# Patient Record
Sex: MALE | Race: WHITE | NOT HISPANIC OR LATINO | Employment: UNEMPLOYED | URBAN - METROPOLITAN AREA
[De-identification: names, ages, dates, MRNs, and addresses within clinical notes are randomized per-mention and may not be internally consistent; named-entity substitution may affect disease eponyms.]

---

## 2019-09-09 ENCOUNTER — HISTORICAL (OUTPATIENT)
Dept: RADIOLOGY | Facility: HOSPITAL | Age: 39
End: 2019-09-09

## 2022-08-10 ENCOUNTER — APPOINTMENT (OUTPATIENT)
Dept: RADIOLOGY | Facility: HOSPITAL | Age: 42
End: 2022-08-10
Payer: MEDICAID

## 2022-08-10 ENCOUNTER — HOSPITAL ENCOUNTER (OUTPATIENT)
Facility: HOSPITAL | Age: 42
Discharge: HOME | End: 2022-08-11
Attending: EMERGENCY MEDICINE | Admitting: INTERNAL MEDICINE
Payer: MEDICAID

## 2022-08-10 DIAGNOSIS — K56.609 SBO (SMALL BOWEL OBSTRUCTION): Primary | ICD-10-CM

## 2022-08-10 PROBLEM — E03.9 HYPOTHYROIDISM: Chronic | Status: ACTIVE | Noted: 2022-08-10

## 2022-08-10 PROBLEM — G47.33 OBSTRUCTIVE SLEEP APNEA SYNDROME: Status: ACTIVE | Noted: 2022-08-10

## 2022-08-10 PROBLEM — G47.33 OBSTRUCTIVE SLEEP APNEA SYNDROME: Chronic | Status: ACTIVE | Noted: 2022-08-10

## 2022-08-10 PROBLEM — E03.9 HYPOTHYROIDISM: Status: ACTIVE | Noted: 2022-08-10

## 2022-08-10 LAB
ALBUMIN SERPL-MCNC: 4.2 GM/DL (ref 3.5–5)
ALBUMIN/GLOB SERPL: 1.2 RATIO (ref 1.1–2)
ALP SERPL-CCNC: 66 UNIT/L (ref 40–150)
ALT SERPL-CCNC: 22 UNIT/L (ref 0–55)
AST SERPL-CCNC: 19 UNIT/L (ref 5–34)
BASOPHILS # BLD AUTO: 0.08 X10(3)/MCL (ref 0–0.2)
BASOPHILS NFR BLD AUTO: 0.7 %
BILIRUBIN DIRECT+TOT PNL SERPL-MCNC: 0.8 MG/DL
BUN SERPL-MCNC: 16 MG/DL (ref 8.9–20.6)
CALCIUM SERPL-MCNC: 9.8 MG/DL (ref 8.4–10.2)
CHLORIDE SERPL-SCNC: 105 MMOL/L (ref 98–107)
CO2 SERPL-SCNC: 16 MMOL/L (ref 22–29)
CREAT SERPL-MCNC: 0.95 MG/DL (ref 0.73–1.18)
EOSINOPHIL # BLD AUTO: 0.14 X10(3)/MCL (ref 0–0.9)
EOSINOPHIL NFR BLD AUTO: 1.2 %
ERYTHROCYTE [DISTWIDTH] IN BLOOD BY AUTOMATED COUNT: 13.5 % (ref 11.5–17)
EST. AVERAGE GLUCOSE BLD GHB EST-MCNC: 162.8 MG/DL
GFR SERPLBLD CREATININE-BSD FMLA CKD-EPI: >60 MLS/MIN/1.73/M2
GLOBULIN SER-MCNC: 3.6 GM/DL (ref 2.4–3.5)
GLUCOSE SERPL-MCNC: 208 MG/DL (ref 74–100)
HBA1C MFR BLD: 7.3 %
HCT VFR BLD AUTO: 49 % (ref 42–52)
HGB BLD-MCNC: 16.5 GM/DL (ref 14–18)
IMM GRANULOCYTES # BLD AUTO: 0.06 X10(3)/MCL (ref 0–0.04)
IMM GRANULOCYTES NFR BLD AUTO: 0.5 %
LACTATE SERPL-SCNC: 1.3 MMOL/L (ref 0.5–2.2)
LIPASE SERPL-CCNC: 36 U/L
LYMPHOCYTES # BLD AUTO: 1.93 X10(3)/MCL (ref 0.6–4.6)
LYMPHOCYTES NFR BLD AUTO: 16.6 %
MAGNESIUM SERPL-MCNC: 1.9 MG/DL (ref 1.6–2.6)
MCH RBC QN AUTO: 27.9 PG (ref 27–31)
MCHC RBC AUTO-ENTMCNC: 33.7 MG/DL (ref 33–36)
MCV RBC AUTO: 82.9 FL (ref 80–94)
MONOCYTES # BLD AUTO: 1.4 X10(3)/MCL (ref 0.1–1.3)
MONOCYTES NFR BLD AUTO: 12 %
NEUTROPHILS # BLD AUTO: 8 X10(3)/MCL (ref 2.1–9.2)
NEUTROPHILS NFR BLD AUTO: 69 %
PLATELET # BLD AUTO: 325 X10(3)/MCL (ref 130–400)
PMV BLD AUTO: 9 FL (ref 7.4–10.4)
POCT GLUCOSE: 166 MG/DL (ref 70–110)
POCT GLUCOSE: 173 MG/DL (ref 70–110)
POTASSIUM SERPL-SCNC: 3.9 MMOL/L (ref 3.5–5.1)
PROT SERPL-MCNC: 7.8 GM/DL (ref 6.4–8.3)
RBC # BLD AUTO: 5.91 X10(6)/MCL (ref 4.7–6.1)
SARS-COV-2 RDRP RESP QL NAA+PROBE: NEGATIVE
SODIUM SERPL-SCNC: 135 MMOL/L (ref 136–145)
WBC # SPEC AUTO: 11.6 X10(3)/MCL (ref 4.5–11.5)

## 2022-08-10 PROCEDURE — 96374 THER/PROPH/DIAG INJ IV PUSH: CPT

## 2022-08-10 PROCEDURE — 99285 EMERGENCY DEPT VISIT HI MDM: CPT | Mod: 25

## 2022-08-10 PROCEDURE — 83690 ASSAY OF LIPASE: CPT | Performed by: EMERGENCY MEDICINE

## 2022-08-10 PROCEDURE — 82962 GLUCOSE BLOOD TEST: CPT

## 2022-08-10 PROCEDURE — 25500020 PHARM REV CODE 255: Performed by: EMERGENCY MEDICINE

## 2022-08-10 PROCEDURE — 36415 COLL VENOUS BLD VENIPUNCTURE: CPT | Performed by: EMERGENCY MEDICINE

## 2022-08-10 PROCEDURE — 80053 COMPREHEN METABOLIC PANEL: CPT | Performed by: EMERGENCY MEDICINE

## 2022-08-10 PROCEDURE — 63600175 PHARM REV CODE 636 W HCPCS: Performed by: EMERGENCY MEDICINE

## 2022-08-10 PROCEDURE — 83735 ASSAY OF MAGNESIUM: CPT | Performed by: EMERGENCY MEDICINE

## 2022-08-10 PROCEDURE — 83605 ASSAY OF LACTIC ACID: CPT | Performed by: EMERGENCY MEDICINE

## 2022-08-10 PROCEDURE — 11000001 HC ACUTE MED/SURG PRIVATE ROOM

## 2022-08-10 PROCEDURE — G0378 HOSPITAL OBSERVATION PER HR: HCPCS

## 2022-08-10 PROCEDURE — 83036 HEMOGLOBIN GLYCOSYLATED A1C: CPT | Performed by: INTERNAL MEDICINE

## 2022-08-10 PROCEDURE — 87635 SARS-COV-2 COVID-19 AMP PRB: CPT | Performed by: EMERGENCY MEDICINE

## 2022-08-10 PROCEDURE — 85025 COMPLETE CBC W/AUTO DIFF WBC: CPT | Performed by: EMERGENCY MEDICINE

## 2022-08-10 PROCEDURE — 25000003 PHARM REV CODE 250: Performed by: INTERNAL MEDICINE

## 2022-08-10 PROCEDURE — 74177 CT ABD & PELVIS W/CONTRAST: CPT | Mod: TC

## 2022-08-10 RX ORDER — ONDANSETRON 2 MG/ML
8 INJECTION INTRAMUSCULAR; INTRAVENOUS EVERY 6 HOURS PRN
Status: DISCONTINUED | OUTPATIENT
Start: 2022-08-10 | End: 2022-08-11 | Stop reason: HOSPADM

## 2022-08-10 RX ORDER — ACETAMINOPHEN 325 MG/1
650 TABLET ORAL EVERY 6 HOURS PRN
Status: DISCONTINUED | OUTPATIENT
Start: 2022-08-10 | End: 2022-08-11 | Stop reason: HOSPADM

## 2022-08-10 RX ORDER — INSULIN ASPART 100 [IU]/ML
0-5 INJECTION, SOLUTION INTRAVENOUS; SUBCUTANEOUS EVERY 6 HOURS PRN
Status: DISCONTINUED | OUTPATIENT
Start: 2022-08-10 | End: 2022-08-11 | Stop reason: HOSPADM

## 2022-08-10 RX ORDER — MORPHINE SULFATE 4 MG/ML
2 INJECTION, SOLUTION INTRAMUSCULAR; INTRAVENOUS EVERY 4 HOURS PRN
Status: DISCONTINUED | OUTPATIENT
Start: 2022-08-10 | End: 2022-08-11 | Stop reason: HOSPADM

## 2022-08-10 RX ORDER — TALC
6 POWDER (GRAM) TOPICAL NIGHTLY PRN
Status: DISCONTINUED | OUTPATIENT
Start: 2022-08-10 | End: 2022-08-10

## 2022-08-10 RX ORDER — SODIUM CHLORIDE 0.9 % (FLUSH) 0.9 %
10 SYRINGE (ML) INJECTION
Status: DISCONTINUED | OUTPATIENT
Start: 2022-08-10 | End: 2022-08-11 | Stop reason: HOSPADM

## 2022-08-10 RX ORDER — SODIUM CHLORIDE 9 MG/ML
INJECTION, SOLUTION INTRAVENOUS CONTINUOUS
Status: DISCONTINUED | OUTPATIENT
Start: 2022-08-10 | End: 2022-08-11 | Stop reason: HOSPADM

## 2022-08-10 RX ORDER — GLUCAGON 1 MG
1 KIT INJECTION
Status: DISCONTINUED | OUTPATIENT
Start: 2022-08-10 | End: 2022-08-11 | Stop reason: HOSPADM

## 2022-08-10 RX ORDER — SODIUM CHLORIDE, SODIUM LACTATE, POTASSIUM CHLORIDE, CALCIUM CHLORIDE 600; 310; 30; 20 MG/100ML; MG/100ML; MG/100ML; MG/100ML
1000 INJECTION, SOLUTION INTRAVENOUS
Status: COMPLETED | OUTPATIENT
Start: 2022-08-10 | End: 2022-08-10

## 2022-08-10 RX ORDER — ONDANSETRON 2 MG/ML
4 INJECTION INTRAMUSCULAR; INTRAVENOUS
Status: COMPLETED | OUTPATIENT
Start: 2022-08-10 | End: 2022-08-10

## 2022-08-10 RX ORDER — DIPHENHYDRAMINE HYDROCHLORIDE 50 MG/ML
12.5 INJECTION INTRAMUSCULAR; INTRAVENOUS EVERY 6 HOURS PRN
Status: DISCONTINUED | OUTPATIENT
Start: 2022-08-10 | End: 2022-08-11 | Stop reason: HOSPADM

## 2022-08-10 RX ADMIN — SODIUM CHLORIDE, POTASSIUM CHLORIDE, SODIUM LACTATE AND CALCIUM CHLORIDE 1000 ML: 600; 310; 30; 20 INJECTION, SOLUTION INTRAVENOUS at 02:08

## 2022-08-10 RX ADMIN — IOPAMIDOL 100 ML: 755 INJECTION, SOLUTION INTRAVENOUS at 12:08

## 2022-08-10 RX ADMIN — ONDANSETRON 4 MG: 2 INJECTION INTRAMUSCULAR; INTRAVENOUS at 10:08

## 2022-08-10 RX ADMIN — SODIUM CHLORIDE: 9 INJECTION, SOLUTION INTRAVENOUS at 05:08

## 2022-08-10 NOTE — HPI
41yo WM with h/o DM2, HTN, RAMY, Morbid Obesity, Hypothyroid presented to ED c/o abd pain, N/V that began yest and persisted today. Also had abd swelling that has resolved since admit. Denies any fever/chills/hematemesis. Did have BM earlier this morning. No recent flatus. He is belching occasionally. Denies any current nausea. Abd pain much improved. Found to have SBO on CT. Admitted for tx. No NGT at this time.

## 2022-08-10 NOTE — H&P
[facility] Medicine  History & Physical    Patient Name: Suhas Clark  MRN: [id number]  Patient Class: IP- Inpatient  Admission Date: 8/10/2022  Attending Physician: Kit Garcia MD  Primary Care Provider: SHINE Vaughn         Patient information was obtained from patient, past medical records and ER records.     Subjective:     Principal Problem:<principal problem not specified>    Chief Complaint:   Chief Complaint   Patient presents with    Abdominal Pain     Pt c/o abd pain vomiting since yesterday states his intestines feel swollen. Lat BM yesterday. Dx with covid 2 days ago.        HPI: 43yo WM with h/o DM2, HTN, RAMY, Morbid Obesity, Hypothyroid presented to ED c/o abd pain, N/V that began yest and persisted today. Also had abd swelling that has resolved since admit. Denies any fever/chills/hematemesis. Did have BM earlier this morning. No recent flatus. He is belching occasionally. Denies any current nausea. Abd pain much improved. Found to have SBO on CT. Admitted for tx. No NGT at this time.       Past Medical History:   Diagnosis Date    Diabetes mellitus     Hypertension     Thyroid disease        No past surgical history on file.    Review of patient's allergies indicates:   Allergen Reactions    Lisinopril Anaphylaxis       No current facility-administered medications on file prior to encounter.     No current outpatient medications on file prior to encounter.     Family History    None       Tobacco Use    Smoking status: Never Smoker    Smokeless tobacco: Never Used   Substance and Sexual Activity    Alcohol use: Not Currently    Drug use: Not on file    Sexual activity: Not on file     Review of Systems: 10 systems reviewed all pertinent positives and negatives stated in HPI, otherwise negative.      Objective:     Vital Signs (Most Recent):  Temp: 97.7 °F (36.5 °C) (08/10/22 1608)  Pulse: 86 (08/10/22 1608)  Resp: 18 (08/10/22  1549)  BP: 122/85 (08/10/22 1608)  SpO2: (!) 92 % (08/10/22 1608)   Vital Signs (24h Range):  Temp:  [97.5 °F (36.4 °C)-97.7 °F (36.5 °C)] 97.7 °F (36.5 °C)  Pulse:  [86-98] 86  Resp:  [18-20] 18  SpO2:  [92 %-96 %] 92 %  BP: (122-168)/() 122/85     Weight: (!) 188.7 kg (416 lb)  Body mass index is 61.43 kg/m².    Physical Exam  Constitutional:       General: He is awake.      Appearance: Normal appearance. He is well-developed. He is morbidly obese. He is not ill-appearing.   HENT:      Head: Normocephalic.      Nose: Nose normal.      Mouth/Throat:      Mouth: Mucous membranes are moist.      Pharynx: Oropharynx is clear.   Eyes:      Conjunctiva/sclera: Conjunctivae normal.      Pupils: Pupils are equal, round, and reactive to light.   Cardiovascular:      Rate and Rhythm: Normal rate and regular rhythm.      Heart sounds: Normal heart sounds. No murmur heard.  Pulmonary:      Effort: Pulmonary effort is normal.      Breath sounds: Normal breath sounds. No wheezing.   Abdominal:      General: Abdomen is protuberant. Bowel sounds are decreased.      Palpations: Abdomen is soft.      Tenderness: There is abdominal tenderness (mild) in the periumbilical area. There is no guarding.   Musculoskeletal:         General: Normal range of motion.      Cervical back: Normal range of motion.      Right lower leg: No edema.      Left lower leg: No edema.   Skin:     General: Skin is warm and dry.      Capillary Refill: Capillary refill takes less than 2 seconds.   Neurological:      General: No focal deficit present.      Mental Status: He is alert and oriented to person, place, and time. Mental status is at baseline.   Psychiatric:         Mood and Affect: Mood normal.         Behavior: Behavior normal. Behavior is cooperative.         Thought Content: Thought content normal.         Judgment: Judgment normal.          Significant Labs: All pertinent labs within the past 24 hours have been reviewed.  CBC:   Recent Labs    Lab 08/10/22  1009   WBC 11.6*   HGB 16.5   HCT 49.0        CMP:   Recent Labs   Lab 08/10/22  1111   *   K 3.9   CO2 16*   BUN 16.0   CREATININE 0.95   CALCIUM 9.8   ALBUMIN 4.2   BILITOT 0.8   ALKPHOS 66   AST 19   ALT 22       Significant Imaging: I have reviewed all pertinent imaging results/findings within the past 24 hours.    CT Abdomen Pelvis With Contrast  Narrative: EXAMINATION:  CT ABDOMEN PELVIS WITH CONTRAST    CLINICAL HISTORY:  Abdominal pain, acute, nonlocalized;    TECHNIQUE:  Low dose axial images, sagittal and coronal reformations were obtained from the lung bases to the pubic symphysis following the IV administration of 100 mL of Omnipaque 350 .  Oral contrast not given.  DLP 1672.  Automated exposure control used.    COMPARISON:  08/28/2019    FINDINGS:  Liver demonstrates normal enhancement with no focal lesion.  Gallbladder removed.  Pancreas, stomach, spleen, adrenal glands normal.  Kidneys normal enhancement bilaterally.  A few scattered cysts on the right.  Suspected nonobstructing calcification lower pole left kidney measuring 2 mm.  No hydronephrosis.  Aorta tapers normally.  There are dilated fluid filled small bowel loops in the proximal ileum region in the mid abdomen with a suspected transition zone in the periumbilical region image 57 series 3.  Distal small bowel loops are decompressed.  Colon decompressed.  No obstructive mass evident.  Appendix normal.  No inflammatory change or lymphadenopathy.  Bladder and rectum normal.  Bones intact.  Lung bases clear.  Impression: Suspected developing bowel obstruction with proximal ileus moderately dilated and fluid-filled and with the somewhat localized transition zone in the periumbilical region.    Electronically signed by: Florinda Isaac MD  Date:    08/10/2022  Time:    13:06      Assessment/Plan:     Problem Noted   Sbo (Small Bowel Obstruction) 8/10/2022   Hypothyroidism 8/10/2022   Obstructive Sleep Apnea Syndrome  8/10/2022   Diabetes Mellitus    Hypertension      - NPO  - IVF  - analgesia/antiemetics prn  - SSI  - am labs  - am SBFT  - home meds when eduin PO        VTE Risk Mitigation (From admission, onward)         Ordered     IP VTE HIGH RISK PATIENT  Once         08/10/22 1356     Place sequential compression device  Until discontinued         08/10/22 1356                   Kit Garcia MD  Department of Hospital Medicine   [facility]

## 2022-08-10 NOTE — SUBJECTIVE & OBJECTIVE
Past Medical History:   Diagnosis Date    Diabetes mellitus     Hypertension     Thyroid disease        No past surgical history on file.    Review of patient's allergies indicates:   Allergen Reactions    Lisinopril Anaphylaxis       No current facility-administered medications on file prior to encounter.     No current outpatient medications on file prior to encounter.     Family History    None       Tobacco Use    Smoking status: Never Smoker    Smokeless tobacco: Never Used   Substance and Sexual Activity    Alcohol use: Not Currently    Drug use: Not on file    Sexual activity: Not on file     Review of Systems: 10 systems reviewed all pertinent positives and negatives stated in HPI, otherwise negative.      Objective:     Vital Signs (Most Recent):  Temp: 97.7 °F (36.5 °C) (08/10/22 1608)  Pulse: 86 (08/10/22 1608)  Resp: 18 (08/10/22 1549)  BP: 122/85 (08/10/22 1608)  SpO2: (!) 92 % (08/10/22 1608)   Vital Signs (24h Range):  Temp:  [97.5 °F (36.4 °C)-97.7 °F (36.5 °C)] 97.7 °F (36.5 °C)  Pulse:  [86-98] 86  Resp:  [18-20] 18  SpO2:  [92 %-96 %] 92 %  BP: (122-168)/() 122/85     Weight: (!) 188.7 kg (416 lb)  Body mass index is 61.43 kg/m².    Physical Exam  Constitutional:       General: He is awake.      Appearance: Normal appearance. He is well-developed. He is morbidly obese. He is not ill-appearing.   HENT:      Head: Normocephalic.      Nose: Nose normal.      Mouth/Throat:      Mouth: Mucous membranes are moist.      Pharynx: Oropharynx is clear.   Eyes:      Conjunctiva/sclera: Conjunctivae normal.      Pupils: Pupils are equal, round, and reactive to light.   Cardiovascular:      Rate and Rhythm: Normal rate and regular rhythm.      Heart sounds: Normal heart sounds. No murmur heard.  Pulmonary:      Effort: Pulmonary effort is normal.      Breath sounds: Normal breath sounds. No wheezing.   Abdominal:      General: Abdomen is protuberant. Bowel sounds are decreased.      Palpations: Abdomen  is soft.      Tenderness: There is abdominal tenderness (mild) in the periumbilical area. There is no guarding.   Musculoskeletal:         General: Normal range of motion.      Cervical back: Normal range of motion.      Right lower leg: No edema.      Left lower leg: No edema.   Skin:     General: Skin is warm and dry.      Capillary Refill: Capillary refill takes less than 2 seconds.   Neurological:      General: No focal deficit present.      Mental Status: He is alert and oriented to person, place, and time. Mental status is at baseline.   Psychiatric:         Mood and Affect: Mood normal.         Behavior: Behavior normal. Behavior is cooperative.         Thought Content: Thought content normal.         Judgment: Judgment normal.          Significant Labs: All pertinent labs within the past 24 hours have been reviewed.  CBC:   Recent Labs   Lab 08/10/22  1009   WBC 11.6*   HGB 16.5   HCT 49.0        CMP:   Recent Labs   Lab 08/10/22  1111   *   K 3.9   CO2 16*   BUN 16.0   CREATININE 0.95   CALCIUM 9.8   ALBUMIN 4.2   BILITOT 0.8   ALKPHOS 66   AST 19   ALT 22       Significant Imaging: I have reviewed all pertinent imaging results/findings within the past 24 hours.    CT Abdomen Pelvis With Contrast  Narrative: EXAMINATION:  CT ABDOMEN PELVIS WITH CONTRAST    CLINICAL HISTORY:  Abdominal pain, acute, nonlocalized;    TECHNIQUE:  Low dose axial images, sagittal and coronal reformations were obtained from the lung bases to the pubic symphysis following the IV administration of 100 mL of Omnipaque 350 .  Oral contrast not given.  DLP 1672.  Automated exposure control used.    COMPARISON:  08/28/2019    FINDINGS:  Liver demonstrates normal enhancement with no focal lesion.  Gallbladder removed.  Pancreas, stomach, spleen, adrenal glands normal.  Kidneys normal enhancement bilaterally.  A few scattered cysts on the right.  Suspected nonobstructing calcification lower pole left kidney measuring 2 mm.   No hydronephrosis.  Aorta tapers normally.  There are dilated fluid filled small bowel loops in the proximal ileum region in the mid abdomen with a suspected transition zone in the periumbilical region image 57 series 3.  Distal small bowel loops are decompressed.  Colon decompressed.  No obstructive mass evident.  Appendix normal.  No inflammatory change or lymphadenopathy.  Bladder and rectum normal.  Bones intact.  Lung bases clear.  Impression: Suspected developing bowel obstruction with proximal ileus moderately dilated and fluid-filled and with the somewhat localized transition zone in the periumbilical region.    Electronically signed by: Florinda Isaac MD  Date:    08/10/2022  Time:    13:06

## 2022-08-10 NOTE — ED PROVIDER NOTES
Encounter Date: 8/10/2022       History     Chief Complaint   Patient presents with    Abdominal Pain     Pt c/o abd pain vomiting since yesterday states his intestines feel swollen. Lat BM yesterday. Dx with covid 2 days ago.     Patient presents emergency department for chief complaint diffuse abdominal discomfort stating he feels very bloated he has been also having nausea and vomiting.  He was diagnosed with COVID 2 days ago.  He states he has had 1 shot of the J&J vaccine in distant past.  Denies any chest pain or shortness of breath he does have sinus congestion.  His base concerns as nausea and vomiting and abdominal discomfort.  In the past he also had his gallbladder removed.        Review of patient's allergies indicates:   Allergen Reactions    Lisinopril Anaphylaxis     Past Medical History:   Diagnosis Date    Diabetes mellitus     Hypertension     Thyroid disease      No past surgical history on file.  No family history on file.  Social History     Tobacco Use    Smoking status: Never Smoker    Smokeless tobacco: Never Used   Substance Use Topics    Alcohol use: Not Currently     Review of Systems   Constitutional: Negative for fever.   HENT: Positive for congestion. Negative for sore throat.    Respiratory: Negative for shortness of breath.    Cardiovascular: Negative for chest pain.   Gastrointestinal: Positive for abdominal distention, abdominal pain, nausea and vomiting.   Genitourinary: Negative for dysuria.   Musculoskeletal: Negative for back pain.   Skin: Negative for rash.   Neurological: Negative for weakness.   Hematological: Does not bruise/bleed easily.       Physical Exam     Initial Vitals [08/10/22 0927]   BP Pulse Resp Temp SpO2   (!) 168/97 98 20 97.5 °F (36.4 °C) 96 %      MAP       --         Physical Exam    Constitutional: He appears well-developed and well-nourished.   Large body habitus   HENT:   Head: Normocephalic and atraumatic.   Eyes: EOM are normal. Pupils are  equal, round, and reactive to light.   Neck:   Normal range of motion.  Cardiovascular: Normal rate and regular rhythm.   Pulmonary/Chest: Breath sounds normal. No respiratory distress. He has no wheezes. He has no rales.   Abdominal: Abdomen is soft. He exhibits distension. There is abdominal tenderness.   Unable to properly assess bowel sounds due to patient's extremely large body habitus. There is no guarding.   Musculoskeletal:         General: Normal range of motion.      Cervical back: Normal range of motion.     Neurological: He is alert and oriented to person, place, and time.         ED Course   Procedures  Labs Reviewed   COMPREHENSIVE METABOLIC PANEL - Abnormal; Notable for the following components:       Result Value    Sodium Level 135 (*)     Carbon Dioxide 16 (*)     Glucose Level 208 (*)     Globulin 3.6 (*)     All other components within normal limits   CBC WITH DIFFERENTIAL - Abnormal; Notable for the following components:    WBC 11.6 (*)     Mono # 1.40 (*)     IG# 0.06 (*)     All other components within normal limits   POCT GLUCOSE - Abnormal; Notable for the following components:    POCT Glucose 166 (*)     All other components within normal limits   MAGNESIUM - Normal   LIPASE - Normal   SARS-COV-2 RNA AMPLIFICATION, QUAL - Normal   LACTIC ACID, PLASMA - Normal   CBC W/ AUTO DIFFERENTIAL    Narrative:     The following orders were created for panel order CBC auto differential.  Procedure                               Abnormality         Status                     ---------                               -----------         ------                     CBC with Differential[[id number]]        Abnormal            Final result                 Please view results for these tests on the individual orders.          Imaging Results          CT Abdomen Pelvis With Contrast (Final result)  Result time 08/10/22 13:06:51    Final result by Erik Isaac MD (08/10/22 13:06:51)                 Impression:       Suspected developing bowel obstruction with proximal ileus moderately dilated and fluid-filled and with the somewhat localized transition zone in the periumbilical region.      Electronically signed by: Florinda Isaac MD  Date:    08/10/2022  Time:    13:06             Narrative:    EXAMINATION:  CT ABDOMEN PELVIS WITH CONTRAST    CLINICAL HISTORY:  Abdominal pain, acute, nonlocalized;    TECHNIQUE:  Low dose axial images, sagittal and coronal reformations were obtained from the lung bases to the pubic symphysis following the IV administration of 100 mL of Omnipaque 350 .  Oral contrast not given.  DLP 1672.  Automated exposure control used.    COMPARISON:  08/28/2019    FINDINGS:  Liver demonstrates normal enhancement with no focal lesion.  Gallbladder removed.  Pancreas, stomach, spleen, adrenal glands normal.  Kidneys normal enhancement bilaterally.  A few scattered cysts on the right.  Suspected nonobstructing calcification lower pole left kidney measuring 2 mm.  No hydronephrosis.  Aorta tapers normally.  There are dilated fluid filled small bowel loops in the proximal ileum region in the mid abdomen with a suspected transition zone in the periumbilical region image 57 series 3.  Distal small bowel loops are decompressed.  Colon decompressed.  No obstructive mass evident.  Appendix normal.  No inflammatory change or lymphadenopathy.  Bladder and rectum normal.  Bones intact.  Lung bases clear.                                 Medications   sodium chloride 0.9% flush 10 mL (has no administration in time range)   0.9%  NaCl infusion ( Intravenous New Bag 8/10/22 3268)   dextrose 50% injection 12.5 g (has no administration in time range)   glucagon (human recombinant) injection 1 mg (has no administration in time range)   insulin aspart U-100 injection 0-5 Units (has no administration in time range)   morphine injection 2 mg (has no administration in time range)   diphenhydrAMINE injection 12.5 mg (has no  administration in time range)   acetaminophen tablet 650 mg (has no administration in time range)   ondansetron injection 8 mg (8 mg Intravenous Given 8/11/22 0043)   ondansetron injection 4 mg (4 mg Intravenous Given 8/10/22 1011)   iopamidoL (ISOVUE-370) injection 100 mL (100 mLs Intravenous Given 8/10/22 1248)   lactated ringers infusion (1,000 mLs Intravenous New Bag 8/10/22 1439)     Medical Decision Making:   Initial Assessment:   Due to body habitus and complaining of abdominal pain and vomiting with CT abdomen pelvis this time.  Due to patient's weight ascertaining whether patient can fit in scanner allowed to be transferred to outside facility for management.  ED Management:  Time 1:52 p.m.  Patient CT abdomen pelvis shows concern for developing small bowel obstruction with concern for transition point.  Discussed case with Dr. Queen, general surgery.  He reviewed CT scan no surgical intervention at this time.  Will place patient on NPO and fluids.  Patient's nausea and vomiting is controlled with Zofran will defer any NG tube at this time. Dr. Garcia to accept.                      Clinical Impression:   Final diagnoses:  [K56.609] SBO (small bowel obstruction) (Primary)          ED Disposition Condition    Admit               Rony Burch MD  08/11/22 0639

## 2022-08-11 VITALS
DIASTOLIC BLOOD PRESSURE: 78 MMHG | OXYGEN SATURATION: 96 % | HEIGHT: 69 IN | TEMPERATURE: 98 F | RESPIRATION RATE: 18 BRPM | HEART RATE: 88 BPM | WEIGHT: 315 LBS | BODY MASS INDEX: 46.65 KG/M2 | SYSTOLIC BLOOD PRESSURE: 133 MMHG

## 2022-08-11 PROBLEM — K56.609 SBO (SMALL BOWEL OBSTRUCTION): Status: RESOLVED | Noted: 2022-08-10 | Resolved: 2022-08-11

## 2022-08-11 LAB
ALBUMIN SERPL-MCNC: 3.9 GM/DL (ref 3.5–5)
ALBUMIN/GLOB SERPL: 1.4 RATIO (ref 1.1–2)
ALP SERPL-CCNC: 58 UNIT/L (ref 40–150)
ALT SERPL-CCNC: 20 UNIT/L (ref 0–55)
AST SERPL-CCNC: 17 UNIT/L (ref 5–34)
BASOPHILS # BLD AUTO: 0.04 X10(3)/MCL (ref 0–0.2)
BASOPHILS NFR BLD AUTO: 0.6 %
BILIRUBIN DIRECT+TOT PNL SERPL-MCNC: 0.8 MG/DL
BUN SERPL-MCNC: 12 MG/DL (ref 8.9–20.6)
CALCIUM SERPL-MCNC: 9 MG/DL (ref 8.4–10.2)
CHLORIDE SERPL-SCNC: 108 MMOL/L (ref 98–107)
CO2 SERPL-SCNC: 18 MMOL/L (ref 22–29)
CREAT SERPL-MCNC: 0.86 MG/DL (ref 0.73–1.18)
EOSINOPHIL # BLD AUTO: 0.09 X10(3)/MCL (ref 0–0.9)
EOSINOPHIL NFR BLD AUTO: 1.3 %
ERYTHROCYTE [DISTWIDTH] IN BLOOD BY AUTOMATED COUNT: 13.4 % (ref 11.5–17)
GFR SERPLBLD CREATININE-BSD FMLA CKD-EPI: >60 MLS/MIN/1.73/M2
GLOBULIN SER-MCNC: 2.7 GM/DL (ref 2.4–3.5)
GLUCOSE SERPL-MCNC: 124 MG/DL (ref 74–100)
HCT VFR BLD AUTO: 44.2 % (ref 42–52)
HGB BLD-MCNC: 14.6 GM/DL (ref 14–18)
IMM GRANULOCYTES # BLD AUTO: 0.01 X10(3)/MCL (ref 0–0.04)
IMM GRANULOCYTES NFR BLD AUTO: 0.1 %
LYMPHOCYTES # BLD AUTO: 1.2 X10(3)/MCL (ref 0.6–4.6)
LYMPHOCYTES NFR BLD AUTO: 17.5 %
MAGNESIUM SERPL-MCNC: 1.9 MG/DL (ref 1.6–2.6)
MCH RBC QN AUTO: 27.7 PG (ref 27–31)
MCHC RBC AUTO-ENTMCNC: 33 MG/DL (ref 33–36)
MCV RBC AUTO: 83.7 FL (ref 80–94)
MONOCYTES # BLD AUTO: 0.98 X10(3)/MCL (ref 0.1–1.3)
MONOCYTES NFR BLD AUTO: 14.3 %
NEUTROPHILS # BLD AUTO: 4.5 X10(3)/MCL (ref 2.1–9.2)
NEUTROPHILS NFR BLD AUTO: 66.2 %
PHOSPHATE SERPL-MCNC: 3.5 MG/DL (ref 2.3–4.7)
PLATELET # BLD AUTO: 279 X10(3)/MCL (ref 130–400)
PMV BLD AUTO: 9.6 FL (ref 7.4–10.4)
POCT GLUCOSE: 140 MG/DL (ref 70–110)
POCT GLUCOSE: 94 MG/DL (ref 70–110)
POTASSIUM SERPL-SCNC: 4 MMOL/L (ref 3.5–5.1)
PROT SERPL-MCNC: 6.6 GM/DL (ref 6.4–8.3)
RBC # BLD AUTO: 5.28 X10(6)/MCL (ref 4.7–6.1)
SODIUM SERPL-SCNC: 140 MMOL/L (ref 136–145)
WBC # SPEC AUTO: 6.9 X10(3)/MCL (ref 4.5–11.5)

## 2022-08-11 PROCEDURE — 84100 ASSAY OF PHOSPHORUS: CPT | Performed by: INTERNAL MEDICINE

## 2022-08-11 PROCEDURE — 83735 ASSAY OF MAGNESIUM: CPT | Performed by: INTERNAL MEDICINE

## 2022-08-11 PROCEDURE — 80053 COMPREHEN METABOLIC PANEL: CPT | Performed by: INTERNAL MEDICINE

## 2022-08-11 PROCEDURE — 63600175 PHARM REV CODE 636 W HCPCS: Performed by: INTERNAL MEDICINE

## 2022-08-11 PROCEDURE — G0378 HOSPITAL OBSERVATION PER HR: HCPCS

## 2022-08-11 PROCEDURE — 94761 N-INVAS EAR/PLS OXIMETRY MLT: CPT

## 2022-08-11 PROCEDURE — 36415 COLL VENOUS BLD VENIPUNCTURE: CPT | Performed by: INTERNAL MEDICINE

## 2022-08-11 PROCEDURE — 85025 COMPLETE CBC W/AUTO DIFF WBC: CPT | Performed by: INTERNAL MEDICINE

## 2022-08-11 PROCEDURE — 25000003 PHARM REV CODE 250: Performed by: INTERNAL MEDICINE

## 2022-08-11 RX ORDER — ANASTROZOLE 1 MG/1
1 TABLET ORAL
Status: DISCONTINUED | OUTPATIENT
Start: 2022-08-11 | End: 2022-08-11 | Stop reason: HOSPADM

## 2022-08-11 RX ORDER — ATENOLOL 50 MG/1
50 TABLET ORAL DAILY
Status: DISCONTINUED | OUTPATIENT
Start: 2022-08-11 | End: 2022-08-11 | Stop reason: HOSPADM

## 2022-08-11 RX ORDER — PREDNISONE 20 MG/1
20 TABLET ORAL DAILY
Status: DISCONTINUED | OUTPATIENT
Start: 2022-08-11 | End: 2022-08-11 | Stop reason: HOSPADM

## 2022-08-11 RX ORDER — CLONAZEPAM 1 MG/1
2 TABLET ORAL 2 TIMES DAILY
Status: DISCONTINUED | OUTPATIENT
Start: 2022-08-11 | End: 2022-08-11 | Stop reason: HOSPADM

## 2022-08-11 RX ORDER — FOLIC ACID 0.4 MG
400 TABLET ORAL DAILY
Status: DISCONTINUED | OUTPATIENT
Start: 2022-08-11 | End: 2022-08-11 | Stop reason: HOSPADM

## 2022-08-11 RX ORDER — ATORVASTATIN CALCIUM 10 MG/1
10 TABLET, FILM COATED ORAL DAILY
Status: DISCONTINUED | OUTPATIENT
Start: 2022-08-11 | End: 2022-08-11 | Stop reason: HOSPADM

## 2022-08-11 RX ORDER — AMLODIPINE BESYLATE 10 MG/1
10 TABLET ORAL DAILY
COMMUNITY

## 2022-08-11 RX ORDER — IBUPROFEN 600 MG/1
600 TABLET ORAL 3 TIMES DAILY
Status: DISCONTINUED | OUTPATIENT
Start: 2022-08-11 | End: 2022-08-11 | Stop reason: HOSPADM

## 2022-08-11 RX ORDER — GLIMEPIRIDE 4 MG/1
4 TABLET ORAL 2 TIMES DAILY
COMMUNITY

## 2022-08-11 RX ORDER — ATORVASTATIN CALCIUM 10 MG/1
10 TABLET, FILM COATED ORAL DAILY
COMMUNITY

## 2022-08-11 RX ORDER — TESTOSTERONE CYPIONATE 1000 MG/10ML
200 INJECTION, SOLUTION INTRAMUSCULAR WEEKLY
COMMUNITY
End: 2023-03-06 | Stop reason: ALTCHOICE

## 2022-08-11 RX ORDER — LEVOTHYROXINE SODIUM 100 UG/1
100 TABLET ORAL DAILY
COMMUNITY

## 2022-08-11 RX ORDER — FOLIC ACID 0.4 MG
400 TABLET ORAL DAILY
COMMUNITY

## 2022-08-11 RX ORDER — IBUPROFEN 200 MG
600 TABLET ORAL 3 TIMES DAILY
COMMUNITY
End: 2023-03-06 | Stop reason: ALTCHOICE

## 2022-08-11 RX ORDER — ERGOCALCIFEROL 1.25 MG/1
50000 CAPSULE ORAL WEEKLY
COMMUNITY

## 2022-08-11 RX ORDER — CLONAZEPAM 1 MG/1
2 TABLET ORAL 2 TIMES DAILY
COMMUNITY
End: 2023-03-06 | Stop reason: ALTCHOICE

## 2022-08-11 RX ORDER — GLIMEPIRIDE 2 MG/1
4 TABLET ORAL 2 TIMES DAILY
Status: DISCONTINUED | OUTPATIENT
Start: 2022-08-11 | End: 2022-08-11 | Stop reason: HOSPADM

## 2022-08-11 RX ORDER — PIOGLITAZONE HCL AND METFORMIN HCL 500; 15 MG/1; MG/1
1 TABLET ORAL DAILY
COMMUNITY

## 2022-08-11 RX ORDER — ANASTROZOLE 1 MG/1
1 TABLET ORAL
COMMUNITY

## 2022-08-11 RX ORDER — ERGOCALCIFEROL 1.25 MG/1
50000 CAPSULE ORAL WEEKLY
Status: DISCONTINUED | OUTPATIENT
Start: 2022-08-11 | End: 2022-08-11 | Stop reason: HOSPADM

## 2022-08-11 RX ORDER — ICOSAPENT ETHYL 1000 MG/1
2 CAPSULE ORAL 2 TIMES DAILY
COMMUNITY

## 2022-08-11 RX ORDER — AMLODIPINE BESYLATE 10 MG/1
10 TABLET ORAL DAILY
Status: DISCONTINUED | OUTPATIENT
Start: 2022-08-11 | End: 2022-08-11 | Stop reason: HOSPADM

## 2022-08-11 RX ORDER — PREDNISONE 20 MG/1
20 TABLET ORAL DAILY
COMMUNITY
End: 2023-03-06 | Stop reason: ALTCHOICE

## 2022-08-11 RX ORDER — LEVOTHYROXINE SODIUM 100 UG/1
100 TABLET ORAL DAILY
Status: DISCONTINUED | OUTPATIENT
Start: 2022-08-11 | End: 2022-08-11 | Stop reason: HOSPADM

## 2022-08-11 RX ORDER — ATENOLOL 50 MG/1
50 TABLET ORAL DAILY
COMMUNITY

## 2022-08-11 RX ADMIN — ATENOLOL 50 MG: 50 TABLET ORAL at 10:08

## 2022-08-11 RX ADMIN — ONDANSETRON 8 MG: 2 INJECTION INTRAMUSCULAR; INTRAVENOUS at 12:08

## 2022-08-11 RX ADMIN — GLIMEPIRIDE 4 MG: 2 TABLET ORAL at 10:08

## 2022-08-11 RX ADMIN — AMLODIPINE BESYLATE 10 MG: 10 TABLET ORAL at 10:08

## 2022-08-11 RX ADMIN — ANASTROZOLE 1 MG: 1 TABLET, COATED ORAL at 09:08

## 2022-08-11 RX ADMIN — ERGOCALCIFEROL 50000 UNITS: 1.25 CAPSULE, LIQUID FILLED ORAL at 10:08

## 2022-08-11 RX ADMIN — ATORVASTATIN CALCIUM 10 MG: 10 TABLET, FILM COATED ORAL at 10:08

## 2022-08-11 RX ADMIN — CLONAZEPAM 2 MG: 1 TABLET ORAL at 10:08

## 2022-08-11 NOTE — DISCHARGE SUMMARY
[facility]  Hospital Medicine  Discharge Summary      Patient Name: Shuas Clark  MRN: [id number]  Patient Class: OP- Observation  Admission Date: 8/10/2022  Hospital Length of Stay: 1 days  Discharge Date and Time:  08/11/2022   Attending Physician: Ovi Miller MD   Discharging Provider: Ovi Miller MD  Primary Care Provider: SHINE Vaughn      HPI:   43yo WM with h/o DM2, HTN, RAMY, Morbid Obesity, Hypothyroid presented to ED c/o abd pain, N/V that began yest and persisted today. Also had abd swelling that has resolved since admit. Denies any fever/chills/hematemesis. Did have BM earlier this morning. No recent flatus. He is belching occasionally. Denies any current nausea. Abd pain much improved. Found to have SBO on CT. Admitted for tx. No NGT at this time.         Hospital Course:   Admitted to Hospital Medicine and placed on bowel rest with IVF. Pt quickly improved and asymptomatic the next day. Passing flatus. Cruz adv diet. Discharge home.         Consults:   Consults (From admission, onward)        Status Ordering Provider     Inpatient consult to General Surgery  Once        Provider:  Bobbi Queen MD    Acknowledged OVI MILLER            Final Active Diagnoses:    Diagnosis Date Noted POA    Hypothyroidism [E03.9] 08/10/2022 Yes     Chronic    Obstructive sleep apnea syndrome [G47.33] 08/10/2022 Yes     Chronic    Diabetes mellitus [E11.9]  Yes     Chronic    Hypertension [I10]  Yes     Chronic      Problems Resolved During this Admission:    Diagnosis Date Noted Date Resolved POA    PRINCIPAL PROBLEM:  SBO (small bowel obstruction) [K56.609] 08/10/2022 08/11/2022 Yes       Physical Exam  Vitals reviewed.   Constitutional:       General: He is awake. He is not in acute distress.     Appearance: Normal appearance. He is well-developed. He is morbidly obese. He is not ill-appearing.   HENT:      Nose: Nose normal.      Mouth/Throat:       Mouth: Mucous membranes are moist.      Pharynx: Oropharynx is clear.   Eyes:      Extraocular Movements: Extraocular movements intact.      Conjunctiva/sclera: Conjunctivae normal.      Pupils: Pupils are equal, round, and reactive to light.   Cardiovascular:      Rate and Rhythm: Normal rate and regular rhythm.      Heart sounds: Normal heart sounds. No murmur heard.  Pulmonary:      Effort: Pulmonary effort is normal.      Breath sounds: Normal breath sounds. No wheezing, rhonchi or rales.   Abdominal:      General: Bowel sounds are normal.      Palpations: Abdomen is soft.      Tenderness: There is no abdominal tenderness. There is no guarding or rebound.   Musculoskeletal:         General: Normal range of motion.      Right lower leg: No edema.      Left lower leg: No edema.   Skin:     General: Skin is warm and dry.      Capillary Refill: Capillary refill takes less than 2 seconds.   Neurological:      General: No focal deficit present.      Mental Status: He is alert. Mental status is at baseline.   Psychiatric:         Mood and Affect: Mood normal.         Behavior: Behavior normal. Behavior is cooperative.         Thought Content: Thought content normal.         Judgment: Judgment normal.             Discharged Condition: good    Disposition: Home or Self Care        Follow Up:   Follow-up Information     SHINE Vaughn Follow up.    Specialty: Family Medicine  Why: As needed  Contact information:  [address] [place] [id number] [phone]                       Patient Instructions:      Diet diabetic     Activity as tolerated       Pending Diagnostic Studies:     None         Medications:  Reconciled Home Medications:      Medication List      CONTINUE taking these medications    ALBUTEROL INHL  Inhale 2 puffs into the lungs every 4 (four) hours as needed (SOB). 90 MCG INHL     amLODIPine 10 MG tablet  Commonly known as: NORVASC  Take 10 mg by mouth once daily. Amlodipine Besylate-  10 mg Every Day     anastrozole 1 mg Tab  Commonly known as: ARIMIDEX  Take 1 mg by mouth twice a week.     atenoloL 50 MG tablet  Commonly known as: TENORMIN  Take 50 mg by mouth once daily.     atorvastatin 10 MG tablet  Commonly known as: LIPITOR  Take 10 mg by mouth once daily. Every Evening     clonazePAM 1 MG tablet  Commonly known as: KlonoPIN  Take 2 mg by mouth 2 (two) times daily.     folic acid 400 MCG tablet  Commonly known as: FOLVITE  Take 400 mcg by mouth once daily.     glimepiride 4 MG tablet  Commonly known as: AMARYL  4 mg 2 (two) times daily.     ibuprofen 200 MG tablet  Commonly known as: ADVIL,MOTRIN  Take 600 mg by mouth 3 (three) times daily. 3 times a day for 7 days     levothyroxine 100 MCG tablet  Commonly known as: SYNTHROID  Take 100 mcg by mouth Daily. Every morning on empty stomach     pioglitazone-metformin  mg per tablet  Commonly known as: ACTOPLUS MET  Take 1 tablet by mouth once daily.     predniSONE 20 MG tablet  Commonly known as: DELTASONE  Take 20 mg by mouth once daily. Daily for 3 days     testosterone cypionate 100 mg/mL injection  Commonly known as: DEPOTESTOTERONE CYPIONATE  Inject 200 mg into the muscle once a week. ON FRIDAYS     VASCEPA 1 gram Cap  Generic drug: icosapent ethyL  Take 2 g by mouth 2 (two) times daily.     VITAMIN D2 50,000 unit Cap  Generic drug: ergocalciferol  Take 50,000 Units by mouth once a week.            Indwelling Lines/Drains at time of discharge:   Lines/Drains/Airways     None                 Time spent on the discharge of patient: 31 minutes         Kit Garcia MD  Department of Hospital Medicine  [facility]

## 2022-08-11 NOTE — HOSPITAL COURSE
Admitted to Hospital Medicine and placed on bowel rest with IVF. Pt quickly improved and asymptomatic the next day. Passing flatus. Cruz adv diet. Discharge home.

## 2022-08-12 ENCOUNTER — HOSPITAL ENCOUNTER (OUTPATIENT)
Facility: HOSPITAL | Age: 42
Discharge: HOME | End: 2022-08-13
Attending: GENERAL ACUTE CARE HOSPITAL | Admitting: INTERNAL MEDICINE
Payer: MEDICAID

## 2022-08-12 ENCOUNTER — APPOINTMENT (OUTPATIENT)
Dept: RADIOLOGY | Facility: HOSPITAL | Age: 42
End: 2022-08-12
Payer: MEDICAID

## 2022-08-12 DIAGNOSIS — K52.9 ENTERITIS: Primary | ICD-10-CM

## 2022-08-12 DIAGNOSIS — K56.600 SMALL BOWEL OBSTRUCTION, PARTIAL: ICD-10-CM

## 2022-08-12 DIAGNOSIS — K56.7 ILEUS: ICD-10-CM

## 2022-08-12 LAB
ABS NEUT CALC (OHS): 2.96 X10(3)/MCL (ref 2.1–9.2)
ALBUMIN SERPL-MCNC: 3.6 GM/DL (ref 3.5–5)
ALBUMIN/GLOB SERPL: 1.1 RATIO (ref 1.1–2)
ALP SERPL-CCNC: 57 UNIT/L (ref 40–150)
ALT SERPL-CCNC: 18 UNIT/L (ref 0–55)
AMYLASE SERPL-CCNC: 21 UNIT/L (ref 25–125)
APPEARANCE UR: CLEAR
AST SERPL-CCNC: 17 UNIT/L (ref 5–34)
BILIRUB UR QL STRIP.AUTO: NEGATIVE MG/DL
BILIRUBIN DIRECT+TOT PNL SERPL-MCNC: 0.8 MG/DL
BUN SERPL-MCNC: 9 MG/DL (ref 8.9–20.6)
CALCIUM SERPL-MCNC: 9.3 MG/DL (ref 8.4–10.2)
CHLORIDE SERPL-SCNC: 107 MMOL/L (ref 98–107)
CO2 SERPL-SCNC: 20 MMOL/L (ref 22–29)
COLOR UR AUTO: YELLOW
CREAT SERPL-MCNC: 0.88 MG/DL (ref 0.73–1.18)
EOSINOPHIL NFR BLD MANUAL: 0.1 X10(3)/MCL (ref 0–0.9)
EOSINOPHIL NFR BLD MANUAL: 2 % (ref 0–8)
ERYTHROCYTE [DISTWIDTH] IN BLOOD BY AUTOMATED COUNT: 13.3 % (ref 11.5–17)
GFR SERPLBLD CREATININE-BSD FMLA CKD-EPI: >60 MLS/MIN/1.73/M2
GLOBULIN SER-MCNC: 3.2 GM/DL (ref 2.4–3.5)
GLUCOSE SERPL-MCNC: 140 MG/DL (ref 74–100)
GLUCOSE UR QL STRIP.AUTO: NEGATIVE MG/DL
HCT VFR BLD AUTO: 42.7 % (ref 42–52)
HGB BLD-MCNC: 14.2 GM/DL (ref 14–18)
IMM GRANULOCYTES # BLD AUTO: 0.01 X10(3)/MCL (ref 0–0.04)
IMM GRANULOCYTES NFR BLD AUTO: 0.2 %
KETONES UR QL STRIP.AUTO: 40 MG/DL
LACTATE SERPL-SCNC: 1.1 MMOL/L (ref 0.5–2.2)
LEUKOCYTE ESTERASE UR QL STRIP.AUTO: NEGATIVE UNIT/L
LIPASE SERPL-CCNC: 11 U/L
LYMPHOCYTES NFR BLD MANUAL: 1.2 X10(3)/MCL
LYMPHOCYTES NFR BLD MANUAL: 23 % (ref 13–40)
MCH RBC QN AUTO: 27.4 PG (ref 27–31)
MCHC RBC AUTO-ENTMCNC: 33.3 MG/DL (ref 33–36)
MCV RBC AUTO: 82.4 FL (ref 80–94)
MONOCYTES NFR BLD MANUAL: 0.94 X10(3)/MCL (ref 0.1–1.3)
MONOCYTES NFR BLD MANUAL: 18 % (ref 2–11)
NEUTROPHILS NFR BLD MANUAL: 57 % (ref 47–80)
NITRITE UR QL STRIP.AUTO: NEGATIVE
PH UR STRIP.AUTO: 5 [PH]
PLATELET # BLD AUTO: 303 X10(3)/MCL (ref 130–400)
PLATELET # BLD EST: ADEQUATE 10*3/UL
PMV BLD AUTO: 8.9 FL (ref 7.4–10.4)
POCT GLUCOSE: 135 MG/DL (ref 70–110)
POTASSIUM SERPL-SCNC: 3.6 MMOL/L (ref 3.5–5.1)
PROT SERPL-MCNC: 6.8 GM/DL (ref 6.4–8.3)
PROT UR QL STRIP.AUTO: NEGATIVE MG/DL
RBC # BLD AUTO: 5.18 X10(6)/MCL (ref 4.7–6.1)
RBC UR QL AUTO: NEGATIVE UNIT/L
SARS-COV-2 RDRP RESP QL NAA+PROBE: NEGATIVE
SODIUM SERPL-SCNC: 139 MMOL/L (ref 136–145)
SP GR UR STRIP.AUTO: 1.01
TROPONIN I SERPL-MCNC: <0.01 NG/ML (ref 0–0.04)
UROBILINOGEN UR STRIP-ACNC: 0.2 MG/DL
WBC # SPEC AUTO: 5.2 X10(3)/MCL (ref 4.5–11.5)

## 2022-08-12 PROCEDURE — 25500020 PHARM REV CODE 255: Performed by: GENERAL ACUTE CARE HOSPITAL

## 2022-08-12 PROCEDURE — 96375 TX/PRO/DX INJ NEW DRUG ADDON: CPT

## 2022-08-12 PROCEDURE — 83605 ASSAY OF LACTIC ACID: CPT | Performed by: GENERAL ACUTE CARE HOSPITAL

## 2022-08-12 PROCEDURE — 80053 COMPREHEN METABOLIC PANEL: CPT | Performed by: GENERAL ACUTE CARE HOSPITAL

## 2022-08-12 PROCEDURE — 87635 SARS-COV-2 COVID-19 AMP PRB: CPT | Performed by: FAMILY MEDICINE

## 2022-08-12 PROCEDURE — 63600175 PHARM REV CODE 636 W HCPCS: Performed by: INTERNAL MEDICINE

## 2022-08-12 PROCEDURE — 25000003 PHARM REV CODE 250: Performed by: FAMILY MEDICINE

## 2022-08-12 PROCEDURE — 85025 COMPLETE CBC W/AUTO DIFF WBC: CPT | Performed by: GENERAL ACUTE CARE HOSPITAL

## 2022-08-12 PROCEDURE — 96375 TX/PRO/DX INJ NEW DRUG ADDON: CPT | Performed by: EMERGENCY MEDICINE

## 2022-08-12 PROCEDURE — 84484 ASSAY OF TROPONIN QUANT: CPT | Performed by: GENERAL ACUTE CARE HOSPITAL

## 2022-08-12 PROCEDURE — 36415 COLL VENOUS BLD VENIPUNCTURE: CPT | Performed by: GENERAL ACUTE CARE HOSPITAL

## 2022-08-12 PROCEDURE — 96365 THER/PROPH/DIAG IV INF INIT: CPT | Performed by: EMERGENCY MEDICINE

## 2022-08-12 PROCEDURE — 63600175 PHARM REV CODE 636 W HCPCS: Performed by: GENERAL ACUTE CARE HOSPITAL

## 2022-08-12 PROCEDURE — 96361 HYDRATE IV INFUSION ADD-ON: CPT | Performed by: EMERGENCY MEDICINE

## 2022-08-12 PROCEDURE — 96376 TX/PRO/DX INJ SAME DRUG ADON: CPT | Performed by: EMERGENCY MEDICINE

## 2022-08-12 PROCEDURE — 96366 THER/PROPH/DIAG IV INF ADDON: CPT | Performed by: EMERGENCY MEDICINE

## 2022-08-12 PROCEDURE — G0378 HOSPITAL OBSERVATION PER HR: HCPCS

## 2022-08-12 PROCEDURE — 83690 ASSAY OF LIPASE: CPT | Performed by: GENERAL ACUTE CARE HOSPITAL

## 2022-08-12 PROCEDURE — 81003 URINALYSIS AUTO W/O SCOPE: CPT | Performed by: GENERAL ACUTE CARE HOSPITAL

## 2022-08-12 PROCEDURE — 25000003 PHARM REV CODE 250: Performed by: INTERNAL MEDICINE

## 2022-08-12 PROCEDURE — 74177 CT ABD & PELVIS W/CONTRAST: CPT | Mod: TC

## 2022-08-12 PROCEDURE — 99285 EMERGENCY DEPT VISIT HI MDM: CPT | Mod: 25

## 2022-08-12 PROCEDURE — 63600175 PHARM REV CODE 636 W HCPCS: Performed by: FAMILY MEDICINE

## 2022-08-12 PROCEDURE — 82150 ASSAY OF AMYLASE: CPT | Performed by: GENERAL ACUTE CARE HOSPITAL

## 2022-08-12 RX ORDER — ATORVASTATIN CALCIUM 10 MG/1
10 TABLET, FILM COATED ORAL DAILY
Status: DISCONTINUED | OUTPATIENT
Start: 2022-08-12 | End: 2022-08-13 | Stop reason: HOSPADM

## 2022-08-12 RX ORDER — ATENOLOL 25 MG/1
50 TABLET ORAL DAILY
Status: DISCONTINUED | OUTPATIENT
Start: 2022-08-12 | End: 2022-08-13 | Stop reason: HOSPADM

## 2022-08-12 RX ORDER — ALBUTEROL SULFATE 90 UG/1
2 AEROSOL, METERED RESPIRATORY (INHALATION) EVERY 4 HOURS PRN
Status: DISCONTINUED | OUTPATIENT
Start: 2022-08-12 | End: 2022-08-13 | Stop reason: HOSPADM

## 2022-08-12 RX ORDER — FOLIC ACID 0.4 MG
400 TABLET ORAL DAILY
Status: DISCONTINUED | OUTPATIENT
Start: 2022-08-12 | End: 2022-08-13 | Stop reason: HOSPADM

## 2022-08-12 RX ORDER — SODIUM CHLORIDE 0.9 % (FLUSH) 0.9 %
10 SYRINGE (ML) INJECTION
Status: DISCONTINUED | OUTPATIENT
Start: 2022-08-12 | End: 2022-08-13 | Stop reason: HOSPADM

## 2022-08-12 RX ORDER — AMLODIPINE BESYLATE 10 MG/1
10 TABLET ORAL DAILY
Status: DISCONTINUED | OUTPATIENT
Start: 2022-08-12 | End: 2022-08-13 | Stop reason: HOSPADM

## 2022-08-12 RX ORDER — ONDANSETRON 2 MG/ML
4 INJECTION INTRAMUSCULAR; INTRAVENOUS
Status: COMPLETED | OUTPATIENT
Start: 2022-08-12 | End: 2022-08-12

## 2022-08-12 RX ORDER — MEPERIDINE HYDROCHLORIDE 50 MG/ML
50 INJECTION INTRAMUSCULAR; INTRAVENOUS; SUBCUTANEOUS
Status: COMPLETED | OUTPATIENT
Start: 2022-08-12 | End: 2022-08-12

## 2022-08-12 RX ORDER — GLIMEPIRIDE 2 MG/1
4 TABLET ORAL
Status: DISCONTINUED | OUTPATIENT
Start: 2022-08-13 | End: 2022-08-13 | Stop reason: HOSPADM

## 2022-08-12 RX ORDER — LEVOTHYROXINE SODIUM 100 UG/1
100 TABLET ORAL DAILY
Status: DISCONTINUED | OUTPATIENT
Start: 2022-08-12 | End: 2022-08-13 | Stop reason: HOSPADM

## 2022-08-12 RX ORDER — MORPHINE SULFATE 4 MG/ML
4 INJECTION, SOLUTION INTRAMUSCULAR; INTRAVENOUS EVERY 4 HOURS PRN
Status: DISCONTINUED | OUTPATIENT
Start: 2022-08-12 | End: 2022-08-13 | Stop reason: HOSPADM

## 2022-08-12 RX ORDER — ONDANSETRON 2 MG/ML
4 INJECTION INTRAMUSCULAR; INTRAVENOUS EVERY 8 HOURS PRN
Status: DISCONTINUED | OUTPATIENT
Start: 2022-08-12 | End: 2022-08-13 | Stop reason: HOSPADM

## 2022-08-12 RX ORDER — SODIUM CHLORIDE 9 MG/ML
INJECTION, SOLUTION INTRAVENOUS CONTINUOUS
Status: DISCONTINUED | OUTPATIENT
Start: 2022-08-12 | End: 2022-08-13 | Stop reason: HOSPADM

## 2022-08-12 RX ORDER — CLONAZEPAM 1 MG/1
2 TABLET ORAL 2 TIMES DAILY
Status: DISCONTINUED | OUTPATIENT
Start: 2022-08-12 | End: 2022-08-13 | Stop reason: HOSPADM

## 2022-08-12 RX ADMIN — CLONAZEPAM 2 MG: 1 TABLET ORAL at 09:08

## 2022-08-12 RX ADMIN — MORPHINE SULFATE 4 MG: 4 INJECTION INTRAVENOUS at 01:08

## 2022-08-12 RX ADMIN — MEPERIDINE HYDROCHLORIDE 50 MG: 50 INJECTION INTRAMUSCULAR; INTRAVENOUS; SUBCUTANEOUS at 03:08

## 2022-08-12 RX ADMIN — ONDANSETRON 4 MG: 2 INJECTION INTRAMUSCULAR; INTRAVENOUS at 03:08

## 2022-08-12 RX ADMIN — IOPAMIDOL 100 ML: 755 INJECTION, SOLUTION INTRAVENOUS at 05:08

## 2022-08-12 RX ADMIN — PIPERACILLIN SODIUM AND TAZOBACTAM SODIUM 4.5 G: 4; .5 INJECTION, POWDER, LYOPHILIZED, FOR SOLUTION INTRAVENOUS at 09:08

## 2022-08-12 RX ADMIN — PIPERACILLIN SODIUM AND TAZOBACTAM SODIUM 4.5 G: 4; .5 INJECTION, POWDER, LYOPHILIZED, FOR SOLUTION INTRAVENOUS at 02:08

## 2022-08-12 RX ADMIN — SODIUM CHLORIDE: 9 INJECTION, SOLUTION INTRAVENOUS at 02:08

## 2022-08-12 RX ADMIN — PIPERACILLIN SODIUM AND TAZOBACTAM SODIUM 4.5 G: 4; .5 INJECTION, POWDER, LYOPHILIZED, FOR SOLUTION INTRAVENOUS at 11:08

## 2022-08-12 RX ADMIN — ONDANSETRON 4 MG: 2 INJECTION INTRAMUSCULAR; INTRAVENOUS at 01:08

## 2022-08-12 NOTE — H&P
[facility] Medicine  History & Physical    Patient Name: Suhas Clark  MRN: [id number]  Patient Class: OP- Observation  Admission Date: 8/12/2022  Attending Physician: Kit Garcia MD  Primary Care Provider: SHINE Vaughn         Patient information was obtained from patient, past medical records and ER records.     Subjective:     Principal Problem:Enteritis    Chief Complaint:   Chief Complaint   Patient presents with    Abdominal Pain     Pt to ED c/o R side abd pain, states was discharged yesterday for SBO and was fine until tonight. Denies ant NVD. Pt c/o severe pain in triage        HPI: 41yo WM with Morbid Obesity, HTN, DM2, Hypothyroid presented to ED early this am c/o acute abd pain recurrence. Pt was discharged yesterday after short admission for PSBO. He was completely asymptomatic all day yest tolerating diet. He felt well after getting home. He awoke around 2-3am with sharp pains in his abd that felt like a burning sensation, mainly in the right carolee-abdomen radiating around to his back. Denies any nausea/vomiting. He presented back to ED for eval. CT A/P shows scattered areas of fluid-filled and thickened bowel without obvious transition point. Labs are completley normal again. States he has had 2 large BM's since admission and does actually feel somewhat better, although he has had IV analgesia. Only current complaint is mild soreness/tenderness in abd now that pain med seems to be wearing off.      Past Medical History:   Diagnosis Date    Diabetes mellitus     Hypertension     Thyroid disease        History reviewed. No pertinent surgical history.    Review of patient's allergies indicates:   Allergen Reactions    Lisinopril Anaphylaxis       Current Facility-Administered Medications on File Prior to Encounter   Medication    [DISCONTINUED] 0.9%  NaCl infusion    [DISCONTINUED] acetaminophen tablet 650 mg    [DISCONTINUED] amLODIPine  tablet 10 mg    [DISCONTINUED] anastrozole tablet 1 mg    [DISCONTINUED] atenoloL tablet 50 mg    [DISCONTINUED] atorvastatin tablet 10 mg    [DISCONTINUED] clonazePAM tablet 2 mg    [DISCONTINUED] dextrose 50% injection 12.5 g    [DISCONTINUED] diphenhydrAMINE injection 12.5 mg    [DISCONTINUED] ergocalciferol capsule 50,000 Units    [DISCONTINUED] folic acid tablet 400 mcg    [DISCONTINUED] glimepiride tablet 4 mg    [DISCONTINUED] glucagon (human recombinant) injection 1 mg    [DISCONTINUED] ibuprofen tablet 600 mg    [DISCONTINUED] insulin aspart U-100 injection 0-5 Units    [DISCONTINUED] levothyroxine tablet 100 mcg    [DISCONTINUED] morphine injection 2 mg    [DISCONTINUED] ondansetron injection 8 mg    [DISCONTINUED] predniSONE tablet 20 mg    [DISCONTINUED] sodium chloride 0.9% flush 10 mL     Current Outpatient Medications on File Prior to Encounter   Medication Sig    ALBUTEROL INHL Inhale 2 puffs into the lungs every 4 (four) hours as needed (SOB). 90 MCG INHL    amLODIPine (NORVASC) 10 MG tablet Take 10 mg by mouth once daily. Amlodipine Besylate- 10 mg Every Day    anastrozole (ARIMIDEX) 1 mg Tab Take 1 mg by mouth twice a week.    atenoloL (TENORMIN) 50 MG tablet Take 50 mg by mouth once daily.    atorvastatin (LIPITOR) 10 MG tablet Take 10 mg by mouth once daily. Every Evening    clonazePAM (KLONOPIN) 1 MG tablet Take 2 mg by mouth 2 (two) times daily.    ergocalciferol (VITAMIN D2) 50,000 unit Cap Take 50,000 Units by mouth once a week.    folic acid (FOLVITE) 400 MCG tablet Take 400 mcg by mouth once daily.    glimepiride (AMARYL) 4 MG tablet 4 mg 2 (two) times daily.    ibuprofen (ADVIL,MOTRIN) 200 MG tablet Take 600 mg by mouth 3 (three) times daily. 3 times a day for 7 days    icosapent ethyL (VASCEPA) 1 gram Cap Take 2 g by mouth 2 (two) times daily.    levothyroxine (SYNTHROID) 100 MCG tablet Take 100 mcg by mouth Daily. Every morning on empty stomach     pioglitazone-metformin (ACTOPLUS MET)  mg per tablet Take 1 tablet by mouth once daily.    predniSONE (DELTASONE) 20 MG tablet Take 20 mg by mouth once daily. Daily for 3 days    testosterone cypionate (DEPOTESTOTERONE CYPIONATE) 100 mg/mL injection Inject 200 mg into the muscle once a week. ON FRIDAYS     Family History    None       Tobacco Use    Smoking status: Never Smoker    Smokeless tobacco: Never Used   Substance and Sexual Activity    Alcohol use: Not Currently    Drug use: Not on file    Sexual activity: Not on file     Review of Systems: 10 systems reviewed all pertinent positives and negatives stated in HPI, otherwise negative.    Objective:     Vital Signs (Most Recent):  Temp: 98.2 °F (36.8 °C) (08/12/22 0902)  Pulse: 103 (08/12/22 1254)  Resp: 16 (08/12/22 1301)  BP: (!) 149/80 (08/12/22 1254)  SpO2: 95 % (08/12/22 1254)   Vital Signs (24h Range):  Temp:  [97.8 °F (36.6 °C)-98.2 °F (36.8 °C)] 98.2 °F (36.8 °C)  Pulse:  [] 103  Resp:  [16-22] 16  SpO2:  [95 %-97 %] 95 %  BP: (126-149)/(75-82) 149/80     Weight: (!) 186 kg (410 lb)  Body mass index is 60.55 kg/m².    Physical Exam  Constitutional:       General: He is awake.      Appearance: Normal appearance. He is well-developed. He is morbidly obese. He is not ill-appearing or toxic-appearing.   HENT:      Head: Normocephalic and atraumatic.      Nose: Nose normal.      Mouth/Throat:      Mouth: Mucous membranes are moist.      Pharynx: Oropharynx is clear.   Eyes:      Extraocular Movements: Extraocular movements intact.      Conjunctiva/sclera: Conjunctivae normal.   Cardiovascular:      Rate and Rhythm: Normal rate and regular rhythm.      Heart sounds: Normal heart sounds. No murmur heard.  Pulmonary:      Effort: Pulmonary effort is normal.      Breath sounds: Normal breath sounds. No wheezing, rhonchi or rales.   Abdominal:      General: Abdomen is protuberant. Bowel sounds are normal.      Palpations: Abdomen is soft.       Tenderness: There is abdominal tenderness in the right upper quadrant, periumbilical area, left upper quadrant and left lower quadrant. There is rebound (slight). There is no guarding.   Musculoskeletal:         General: No swelling. Normal range of motion.      Right lower leg: No edema.      Left lower leg: No edema.   Skin:     General: Skin is warm and dry.   Neurological:      General: No focal deficit present.      Mental Status: He is alert and oriented to person, place, and time. Mental status is at baseline.   Psychiatric:         Mood and Affect: Mood normal.         Behavior: Behavior normal. Behavior is cooperative.         Thought Content: Thought content normal.         Judgment: Judgment normal.           Significant Labs: All pertinent labs within the past 24 hours have been reviewed.  CBC:   Recent Labs   Lab 08/11/22  0426 08/12/22  0323   WBC 6.9 5.2   HGB 14.6 14.2   HCT 44.2 42.7    303     CMP:   Recent Labs   Lab 08/11/22  0426 08/12/22  0322    139   K 4.0 3.6   CO2 18* 20*   BUN 12.0 9.0   CREATININE 0.86 0.88   CALCIUM 9.0 9.3   ALBUMIN 3.9 3.6   BILITOT 0.8 0.8   ALKPHOS 58 57   AST 17 17   ALT 20 18     Lipase:   Recent Labs   Lab 08/12/22  0322   LIPASE 11       Significant Imaging: I have reviewed all pertinent imaging results/findings within the past 24 hours.    CT Abdomen Pelvis With Contrast  Narrative: Technique:CT of the abdomen and pelvis was performed with axial images as well as sagittal and coronal reconstruction images with intravenous contrast.    Comparison: August 10, 2022.    Clinical History:Abdominal Pain.    Dosage Information:Automated Exposure Control was utilized.    Findings:    Lines and Tubes:None.    Thorax:    Lungs:The visualized lung bases appear unremarkable. No focal infiltrate or consolidation is seen.    Pleura:No effusions or thickening.    Abdomen:    Abdominal Wall:No abdominal wall pathology is seen.    Liver:Fatty infiltration of the  liver is present.    Biliary System:No intrahepatic or extrahepatic biliary duct dilatation is seen.    Gallbladder:A surgical clip is seen in the gallbladder fossa consistent with prior cholecystectomy.    Pancreas:The pancreas appears unremarkable.    Spleen:The spleen appears unremarkable.    Adrenals:The adrenal glands appear unremarkable.    Kidneys:The right kidney appears unremarkable with no stones masses or hydronephrosis except for tiny 1.7 cm cortical cyst in midpole. The left kidney appears unremarkable with no stones cysts masses or    Bowel:    Esophagus:The visualized esophagus appears unremarkable.    Stomach:The stomach appears unremarkable.    Duodenum:Unremarkable appearing duodenum.    Small Bowel:There is variable fluid distension of non-contiguous small bowel without discrete focal transition. There is mild wall and fold thickening of some distended loops especially in the left abdomen. Findings favor enteritis with ileus. The possibility of partial small bowel obstruction from multifocal adhesions is not excluded. A small bowel follow through exam may be useful to differentiate if needed. Otherwise follow as clinically indicated. Clinical findings take precedence.    Colon:Nondistended.    Appendix:The appendix appears unremarkable as seen in series 4 ,image [id number].    Peritoneum:No intraperitoneal free air or ascites is seen.    Pelvis:    Bladder:The bladder appears unremarkable.    Male:    Prostate gland:The prostate gland appears unremarkable.    Bony structures:    Dorsal Spine:The visualized dorsal spine appears unremarkable.    Bony Pelvis:The visualized bony structures of the pelvis appear unremarkable.  Impression: Impression:    There is about similar variable fluid distension of non-contiguous small bowel without discrete focal transition. There is mild wall and fold thickening of some distended loops especially in the left abdomen. Findings favor enteritis with ileus. The  possibility of partial small bowel obstruction from multifocal adhesions is not excluded. A small bowel follow through exam may be useful to differentiate if needed. Otherwise follow as clinically indicated. Clinical findings take precedence.    No significant discrepancy with overnight    Electronically signed by: Arthur Caputo  Date:    08/12/2022  Time:    08:26      Assessment/Plan:     Problem Noted   Enteritis 8/12/2022   Ileus 8/12/2022   Small Bowel Obstruction, Partial 8/10/2022   Hypothyroidism 8/10/2022   Obstructive Sleep Apnea Syndrome 8/10/2022   Diabetes Mellitus    Hypertension      - NPO  - IVF  - Zosyn  - analgesia  - am labs      VTE Risk Mitigation (From admission, onward)    None             Kit Garcia MD  Department of Hospital Medicine   [facility]

## 2022-08-12 NOTE — SUBJECTIVE & OBJECTIVE
Past Medical History:   Diagnosis Date    Diabetes mellitus     Hypertension     Thyroid disease        History reviewed. No pertinent surgical history.    Review of patient's allergies indicates:   Allergen Reactions    Lisinopril Anaphylaxis       Current Facility-Administered Medications on File Prior to Encounter   Medication    [DISCONTINUED] 0.9%  NaCl infusion    [DISCONTINUED] acetaminophen tablet 650 mg    [DISCONTINUED] amLODIPine tablet 10 mg    [DISCONTINUED] anastrozole tablet 1 mg    [DISCONTINUED] atenoloL tablet 50 mg    [DISCONTINUED] atorvastatin tablet 10 mg    [DISCONTINUED] clonazePAM tablet 2 mg    [DISCONTINUED] dextrose 50% injection 12.5 g    [DISCONTINUED] diphenhydrAMINE injection 12.5 mg    [DISCONTINUED] ergocalciferol capsule 50,000 Units    [DISCONTINUED] folic acid tablet 400 mcg    [DISCONTINUED] glimepiride tablet 4 mg    [DISCONTINUED] glucagon (human recombinant) injection 1 mg    [DISCONTINUED] ibuprofen tablet 600 mg    [DISCONTINUED] insulin aspart U-100 injection 0-5 Units    [DISCONTINUED] levothyroxine tablet 100 mcg    [DISCONTINUED] morphine injection 2 mg    [DISCONTINUED] ondansetron injection 8 mg    [DISCONTINUED] predniSONE tablet 20 mg    [DISCONTINUED] sodium chloride 0.9% flush 10 mL     Current Outpatient Medications on File Prior to Encounter   Medication Sig    ALBUTEROL INHL Inhale 2 puffs into the lungs every 4 (four) hours as needed (SOB). 90 MCG INHL    amLODIPine (NORVASC) 10 MG tablet Take 10 mg by mouth once daily. Amlodipine Besylate- 10 mg Every Day    anastrozole (ARIMIDEX) 1 mg Tab Take 1 mg by mouth twice a week.    atenoloL (TENORMIN) 50 MG tablet Take 50 mg by mouth once daily.    atorvastatin (LIPITOR) 10 MG tablet Take 10 mg by mouth once daily. Every Evening    clonazePAM (KLONOPIN) 1 MG tablet Take 2 mg by mouth 2 (two) times daily.    ergocalciferol (VITAMIN D2) 50,000 unit Cap Take 50,000 Units by mouth once a  week.    folic acid (FOLVITE) 400 MCG tablet Take 400 mcg by mouth once daily.    glimepiride (AMARYL) 4 MG tablet 4 mg 2 (two) times daily.    ibuprofen (ADVIL,MOTRIN) 200 MG tablet Take 600 mg by mouth 3 (three) times daily. 3 times a day for 7 days    icosapent ethyL (VASCEPA) 1 gram Cap Take 2 g by mouth 2 (two) times daily.    levothyroxine (SYNTHROID) 100 MCG tablet Take 100 mcg by mouth Daily. Every morning on empty stomach    pioglitazone-metformin (ACTOPLUS MET)  mg per tablet Take 1 tablet by mouth once daily.    predniSONE (DELTASONE) 20 MG tablet Take 20 mg by mouth once daily. Daily for 3 days    testosterone cypionate (DEPOTESTOTERONE CYPIONATE) 100 mg/mL injection Inject 200 mg into the muscle once a week. ON FRIDAYS     Family History    None       Tobacco Use    Smoking status: Never Smoker    Smokeless tobacco: Never Used   Substance and Sexual Activity    Alcohol use: Not Currently    Drug use: Not on file    Sexual activity: Not on file     Review of Systems: 10 systems reviewed all pertinent positives and negatives stated in HPI, otherwise negative.    Objective:     Vital Signs (Most Recent):  Temp: 98.2 °F (36.8 °C) (08/12/22 0902)  Pulse: 103 (08/12/22 1254)  Resp: 16 (08/12/22 1301)  BP: (!) 149/80 (08/12/22 1254)  SpO2: 95 % (08/12/22 1254)   Vital Signs (24h Range):  Temp:  [97.8 °F (36.6 °C)-98.2 °F (36.8 °C)] 98.2 °F (36.8 °C)  Pulse:  [] 103  Resp:  [16-22] 16  SpO2:  [95 %-97 %] 95 %  BP: (126-149)/(75-82) 149/80     Weight: (!) 186 kg (410 lb)  Body mass index is 60.55 kg/m².    Physical Exam  Constitutional:       General: He is awake.      Appearance: Normal appearance. He is well-developed. He is morbidly obese. He is not ill-appearing or toxic-appearing.   HENT:      Head: Normocephalic and atraumatic.      Nose: Nose normal.      Mouth/Throat:      Mouth: Mucous membranes are moist.      Pharynx: Oropharynx is clear.   Eyes:      Extraocular Movements:  Extraocular movements intact.      Conjunctiva/sclera: Conjunctivae normal.   Cardiovascular:      Rate and Rhythm: Normal rate and regular rhythm.      Heart sounds: Normal heart sounds. No murmur heard.  Pulmonary:      Effort: Pulmonary effort is normal.      Breath sounds: Normal breath sounds. No wheezing, rhonchi or rales.   Abdominal:      General: Abdomen is protuberant. Bowel sounds are normal.      Palpations: Abdomen is soft.      Tenderness: There is abdominal tenderness in the right upper quadrant, periumbilical area, left upper quadrant and left lower quadrant. There is rebound (slight). There is no guarding.   Musculoskeletal:         General: No swelling. Normal range of motion.      Right lower leg: No edema.      Left lower leg: No edema.   Skin:     General: Skin is warm and dry.   Neurological:      General: No focal deficit present.      Mental Status: He is alert and oriented to person, place, and time. Mental status is at baseline.   Psychiatric:         Mood and Affect: Mood normal.         Behavior: Behavior normal. Behavior is cooperative.         Thought Content: Thought content normal.         Judgment: Judgment normal.           Significant Labs: All pertinent labs within the past 24 hours have been reviewed.  CBC:   Recent Labs   Lab 08/11/22  0426 08/12/22  0323   WBC 6.9 5.2   HGB 14.6 14.2   HCT 44.2 42.7    303     CMP:   Recent Labs   Lab 08/11/22  0426 08/12/22  0322    139   K 4.0 3.6   CO2 18* 20*   BUN 12.0 9.0   CREATININE 0.86 0.88   CALCIUM 9.0 9.3   ALBUMIN 3.9 3.6   BILITOT 0.8 0.8   ALKPHOS 58 57   AST 17 17   ALT 20 18     Lipase:   Recent Labs   Lab 08/12/22  0322   LIPASE 11       Significant Imaging: I have reviewed all pertinent imaging results/findings within the past 24 hours.    CT Abdomen Pelvis With Contrast  Narrative: Technique:CT of the abdomen and pelvis was performed with axial images as well as sagittal and coronal reconstruction images with  intravenous contrast.    Comparison: August 10, 2022.    Clinical History:Abdominal Pain.    Dosage Information:Automated Exposure Control was utilized.    Findings:    Lines and Tubes:None.    Thorax:    Lungs:The visualized lung bases appear unremarkable. No focal infiltrate or consolidation is seen.    Pleura:No effusions or thickening.    Abdomen:    Abdominal Wall:No abdominal wall pathology is seen.    Liver:Fatty infiltration of the liver is present.    Biliary System:No intrahepatic or extrahepatic biliary duct dilatation is seen.    Gallbladder:A surgical clip is seen in the gallbladder fossa consistent with prior cholecystectomy.    Pancreas:The pancreas appears unremarkable.    Spleen:The spleen appears unremarkable.    Adrenals:The adrenal glands appear unremarkable.    Kidneys:The right kidney appears unremarkable with no stones masses or hydronephrosis except for tiny 1.7 cm cortical cyst in midpole. The left kidney appears unremarkable with no stones cysts masses or    Bowel:    Esophagus:The visualized esophagus appears unremarkable.    Stomach:The stomach appears unremarkable.    Duodenum:Unremarkable appearing duodenum.    Small Bowel:There is variable fluid distension of non-contiguous small bowel without discrete focal transition. There is mild wall and fold thickening of some distended loops especially in the left abdomen. Findings favor enteritis with ileus. The possibility of partial small bowel obstruction from multifocal adhesions is not excluded. A small bowel follow through exam may be useful to differentiate if needed. Otherwise follow as clinically indicated. Clinical findings take precedence.    Colon:Nondistended.    Appendix:The appendix appears unremarkable as seen in series 4 ,image [id number].    Peritoneum:No intraperitoneal free air or ascites is seen.    Pelvis:    Bladder:The bladder appears unremarkable.    Male:    Prostate gland:The prostate gland appears unremarkable.    Bony  structures:    Dorsal Spine:The visualized dorsal spine appears unremarkable.    Bony Pelvis:The visualized bony structures of the pelvis appear unremarkable.  Impression: Impression:    There is about similar variable fluid distension of non-contiguous small bowel without discrete focal transition. There is mild wall and fold thickening of some distended loops especially in the left abdomen. Findings favor enteritis with ileus. The possibility of partial small bowel obstruction from multifocal adhesions is not excluded. A small bowel follow through exam may be useful to differentiate if needed. Otherwise follow as clinically indicated. Clinical findings take precedence.    No significant discrepancy with overnight    Electronically signed by: Arthur Caputo  Date:    08/12/2022  Time:    08:26

## 2022-08-12 NOTE — HPI
43yo WM with Morbid Obesity, HTN, DM2, Hypothyroid presented to ED early this am c/o acute abd pain recurrence. Pt was discharged yesterday after short admission for PSBO. He was completely asymptomatic all day yest tolerating diet. He felt well after getting home. He awoke around 2-3am with sharp pains in his abd that felt like a burning sensation, mainly in the right carolee-abdomen radiating around to his back. Denies any nausea/vomiting. He presented back to ED for eval. CT A/P shows scattered areas of fluid-filled and thickened bowel without obvious transition point. Labs are completley normal again. States he has had 2 large BM's since admission and does actually feel somewhat better, although he has had IV analgesia. Only current complaint is mild soreness/tenderness in abd now that pain med seems to be wearing off.

## 2022-08-12 NOTE — ED PROVIDER NOTES
Encounter Date: 8/12/2022       History     Chief Complaint   Patient presents with    Abdominal Pain     Pt to ED c/o R side abd pain, states was discharged yesterday for SBO and was fine until tonight. Denies ant NVD. Pt c/o severe pain in triage     Given emergency room with chief complain severe abdominal pain which woke him up from sleep.  Patient reports that 1 hour before he was discharged from hospital were cirrhosis they for 1 day for partial bowel obstruction, no abdominal pain under discharge time, patient was evaluated by surgeon (Dr. Queen), who reported that he does not require surgery, had 2 small bowel movements yesterday        Review of patient's allergies indicates:   Allergen Reactions    Lisinopril Anaphylaxis     Past Medical History:   Diagnosis Date    Diabetes mellitus     Hypertension     Thyroid disease      History reviewed. No pertinent surgical history.  History reviewed. No pertinent family history.  Social History     Tobacco Use    Smoking status: Never Smoker    Smokeless tobacco: Never Used   Substance Use Topics    Alcohol use: Not Currently     Review of Systems   Gastrointestinal: Positive for abdominal pain and nausea.   All other systems reviewed and are negative.      Physical Exam     Initial Vitals [08/12/22 0254]   BP Pulse Resp Temp SpO2   126/82 108 (!) 22 97.8 °F (36.6 °C) 97 %      MAP       --         Physical Exam    Nursing note and vitals reviewed.  Constitutional: He appears well-developed and well-nourished. He is Obese .   In sever pain distress   HENT:   Head: Normocephalic and atraumatic.   Right Ear: External ear normal.   Left Ear: External ear normal.   Nose: Nose normal.   Mouth/Throat: Oropharynx is clear and moist.   Eyes: Conjunctivae and EOM are normal. Pupils are equal, round, and reactive to light.   Neck: Neck supple.   Normal range of motion.  Cardiovascular: Normal rate, regular rhythm and normal heart sounds.   Pulmonary/Chest: Breath  sounds normal.   Abdominal: Bowel sounds are decreased. There is abdominal tenderness in the periumbilical area.   Musculoskeletal:      Cervical back: Normal range of motion and neck supple.     Neurological: He is alert. He has normal reflexes. GCS score is 15. GCS eye subscore is 4. GCS verbal subscore is 5. GCS motor subscore is 6.   Skin: Skin is warm. Capillary refill takes 2 to 3 seconds.   Psychiatric: He has a normal mood and affect. His behavior is normal. Judgment and thought content normal.         ED Course   Procedures  Labs Reviewed   AMYLASE - Abnormal; Notable for the following components:       Result Value    Amylase Level 21 (*)     All other components within normal limits   COMPREHENSIVE METABOLIC PANEL - Abnormal; Notable for the following components:    Carbon Dioxide 20 (*)     Glucose Level 140 (*)     All other components within normal limits   URINALYSIS, REFLEX TO URINE CULTURE - Abnormal; Notable for the following components:    Ketones, UA 40  (*)     All other components within normal limits   MANUAL DIFFERENTIAL - Abnormal; Notable for the following components:    Monocyte Man 18 (*)     All other components within normal limits   LIPASE - Normal   LACTIC ACID, PLASMA - Normal   TROPONIN I - Normal   CBC W/ AUTO DIFFERENTIAL    Narrative:     The following orders were created for panel order CBC auto differential.  Procedure                               Abnormality         Status                     ---------                               -----------         ------                     CBC with Differential[[id number]]                            Final result               Manual Differential[[id number]]          Abnormal            Final result                 Please view results for these tests on the individual orders.   CBC WITH DIFFERENTIAL   SARS-COV-2 RNA AMPLIFICATION, QUAL          Imaging Results          CT Abdomen Pelvis With Contrast (Preliminary result)  Result time  08/12/22 05:50:14    Preliminary result by Interface, Rad Results In (08/12/22 05:50:14)                 Narrative:    START OF REPORT:  Technique: CT of the abdomen and pelvis was performed with axial images as well as sagittal and coronal reconstruction images with intravenous contrast.    Comparison: None available.    Clinical History: Abdominal Pain.    Dosage Information: Automated Exposure Control was utilized.    Findings:  Lines and Tubes: None.  Thorax:  Lungs: The visualized lung bases appear unremarkable. No focal infiltrate or consolidation is seen.  Pleura: No effusions or thickening.  Heart: The heart size is within normal limits.  Abdomen:  Abdominal Wall: No abdominal wall pathology is seen.  Liver: Fatty infiltration of the liver is present.  Biliary System: No intrahepatic or extrahepatic biliary duct dilatation is seen.  Gallbladder: A surgical clip is seen in the gallbladder fossa consistent with prior cholecystectomy.  Pancreas: The pancreas appears unremarkable.  Spleen: The spleen appears unremarkable.  Adrenals: The adrenal glands appear unremarkable.  Kidneys: The right kidney appears unremarkable with no stones masses or hydronephrosis except for tiny 1.7 cm cortical cyst in midpole. The left kidney appears unremarkable with no stones cysts masses or hydronephrosis.  Aorta: The abdominal aorta appears unremarkable.  IVC: Unremarkable.  Bowel:  Esophagus: The visualized esophagus appears unremarkable.  Stomach: The stomach appears unremarkable.  Duodenum: Unremarkable appearing duodenum.  Small Bowel: There is variable fluid distension of non-contiguous small bowel without discrete focal transition. There is mild wall and fold thickening of some distended loops especially in the left abdomen. Findings favor enteritis with ileus. The possibility of partial small bowel obstruction from multifocal adhesions is not excluded. A small bowel follow through exam may be useful to differentiate if  needed. Otherwise follow as clinically indicated. Clinical findings take precedence.  Colon: Nondistended.  Appendix: The appendix appears unremarkable as seen in series 4 ,image 63-73.  Peritoneum: No intraperitoneal free air or ascites is seen.    Pelvis:  Bladder: The bladder appears unremarkable.  Male:  Prostate gland: The prostate gland appears unremarkable.    Bony structures:  Dorsal Spine: The visualized dorsal spine appears unremarkable.  Bony Pelvis: The visualized bony structures of the pelvis appear unremarkable.      Impression:  1. There is variable fluid distension of non-contiguous small bowel without discrete focal transition. There is mild wall and fold thickening of some distended loops especially in the left abdomen. Findings favor enteritis with ileus. The possibility of partial small bowel obstruction from multifocal adhesions is not excluded. A small bowel follow through exam may be useful to differentiate if needed. Otherwise follow as clinically indicated. Clinical findings take precedence.                      Preliminary result by Brandan Hanson Jr., MD (08/12/22 05:50:14)                 Narrative:    START OF REPORT:  Technique: CT of the abdomen and pelvis was performed with axial images as well as sagittal and coronal reconstruction images with intravenous contrast.    Comparison: None available.    Clinical History: Abdominal Pain.    Dosage Information: Automated Exposure Control was utilized.    Findings:  Lines and Tubes: None.  Thorax:  Lungs: The visualized lung bases appear unremarkable. No focal infiltrate or consolidation is seen.  Pleura: No effusions or thickening.  Heart: The heart size is within normal limits.  Abdomen:  Abdominal Wall: No abdominal wall pathology is seen.  Liver: Fatty infiltration of the liver is present.  Biliary System: No intrahepatic or extrahepatic biliary duct dilatation is seen.  Gallbladder: A surgical clip is seen in the gallbladder fossa  consistent with prior cholecystectomy.  Pancreas: The pancreas appears unremarkable.  Spleen: The spleen appears unremarkable.  Adrenals: The adrenal glands appear unremarkable.  Kidneys: The right kidney appears unremarkable with no stones masses or hydronephrosis except for tiny 1.7 cm cortical cyst in midpole. The left kidney appears unremarkable with no stones cysts masses or hydronephrosis.  Aorta: The abdominal aorta appears unremarkable.  IVC: Unremarkable.  Bowel:  Esophagus: The visualized esophagus appears unremarkable.  Stomach: The stomach appears unremarkable.  Duodenum: Unremarkable appearing duodenum.  Small Bowel: There is variable fluid distension of non-contiguous small bowel without discrete focal transition. There is mild wall and fold thickening of some distended loops especially in the left abdomen. Findings favor enteritis with ileus. The possibility of partial small bowel obstruction from multifocal adhesions is not excluded. A small bowel follow through exam may be useful to differentiate if needed. Otherwise follow as clinically indicated. Clinical findings take precedence.  Colon: Nondistended.  Appendix: The appendix appears unremarkable as seen in series 4 ,image 63-73.  Peritoneum: No intraperitoneal free air or ascites is seen.    Pelvis:  Bladder: The bladder appears unremarkable.  Male:  Prostate gland: The prostate gland appears unremarkable.    Bony structures:  Dorsal Spine: The visualized dorsal spine appears unremarkable.  Bony Pelvis: The visualized bony structures of the pelvis appear unremarkable.      Impression:  1. There is variable fluid distension of non-contiguous small bowel without discrete focal transition. There is mild wall and fold thickening of some distended loops especially in the left abdomen. Findings favor enteritis with ileus. The possibility of partial small bowel obstruction from multifocal adhesions is not excluded. A small bowel follow through exam may  be useful to differentiate if needed. Otherwise follow as clinically indicated. Clinical findings take precedence.                                   Medications   sodium chloride 0.9% flush 10 mL (has no administration in time range)   morphine injection 4 mg (has no administration in time range)   ondansetron injection 4 mg (has no administration in time range)   piperacillin-tazobactam (ZOSYN) 4.5 g in dextrose 5 % in water (D5W) 5 % 100 mL IVPB (MB+) (has no administration in time range)   meperidine injection 50 mg (50 mg Intravenous Given 8/12/22 0334)   ondansetron injection 4 mg (4 mg Intravenous Given 8/12/22 0334)   iopamidoL (ISOVUE-370) injection 100 mL (100 mLs Intravenous Given 8/12/22 0555)                 ED Course as of 08/12/22 0718   Fri Aug 12, 2022   0716 On examination, rebound tenderness present; patient feeling significantly improved though with Demerol.  Discussed with hospitalist and ok to admit. Will place on Zosyn.  Discussed results with patient and family. [MW]      ED Course User Index  [MW] Srini Ferguson MD             Clinical Impression:   Final diagnoses:  [K56.600] Small bowel obstruction, partial          ED Disposition Condition    Observation               Srini Ferguson MD  08/12/22 0718

## 2022-08-12 NOTE — PLAN OF CARE
Pt from home w/spouse.  Uses CPAP and glucometer at home.  PCP: Isabella Wilson. Rx [facility].  NO HH.

## 2022-08-13 VITALS
BODY MASS INDEX: 46.65 KG/M2 | DIASTOLIC BLOOD PRESSURE: 78 MMHG | RESPIRATION RATE: 16 BRPM | TEMPERATURE: 97 F | OXYGEN SATURATION: 97 % | HEIGHT: 69 IN | SYSTOLIC BLOOD PRESSURE: 133 MMHG | WEIGHT: 315 LBS | HEART RATE: 87 BPM

## 2022-08-13 LAB
POCT GLUCOSE: 118 MG/DL (ref 70–110)
POCT GLUCOSE: 94 MG/DL (ref 70–110)

## 2022-08-13 PROCEDURE — 96361 HYDRATE IV INFUSION ADD-ON: CPT | Performed by: EMERGENCY MEDICINE

## 2022-08-13 PROCEDURE — 63600175 PHARM REV CODE 636 W HCPCS: Performed by: INTERNAL MEDICINE

## 2022-08-13 PROCEDURE — 25000003 PHARM REV CODE 250: Performed by: INTERNAL MEDICINE

## 2022-08-13 PROCEDURE — G0378 HOSPITAL OBSERVATION PER HR: HCPCS

## 2022-08-13 PROCEDURE — 96366 THER/PROPH/DIAG IV INF ADDON: CPT | Performed by: EMERGENCY MEDICINE

## 2022-08-13 RX ORDER — CIPROFLOXACIN 500 MG/1
500 TABLET ORAL 2 TIMES DAILY
Qty: 10 TABLET | Refills: 0 | Status: SHIPPED | OUTPATIENT
Start: 2022-08-13 | End: 2022-08-18

## 2022-08-13 RX ORDER — METRONIDAZOLE 500 MG/1
500 TABLET ORAL 3 TIMES DAILY
Qty: 15 TABLET | Refills: 0 | Status: SHIPPED | OUTPATIENT
Start: 2022-08-13 | End: 2022-08-18

## 2022-08-13 RX ADMIN — CLONAZEPAM 2 MG: 1 TABLET ORAL at 08:08

## 2022-08-13 RX ADMIN — PIPERACILLIN SODIUM AND TAZOBACTAM SODIUM 4.5 G: 4; .5 INJECTION, POWDER, LYOPHILIZED, FOR SOLUTION INTRAVENOUS at 06:08

## 2022-08-13 RX ADMIN — ATORVASTATIN CALCIUM 10 MG: 10 TABLET, FILM COATED ORAL at 08:08

## 2022-08-13 RX ADMIN — AMLODIPINE BESYLATE 10 MG: 10 TABLET ORAL at 08:08

## 2022-08-13 RX ADMIN — GLIMEPIRIDE 4 MG: 2 TABLET ORAL at 08:08

## 2022-08-13 RX ADMIN — ATENOLOL 50 MG: 25 TABLET ORAL at 08:08

## 2022-08-13 RX ADMIN — SODIUM CHLORIDE: 9 INJECTION, SOLUTION INTRAVENOUS at 06:08

## 2022-09-08 NOTE — DISCHARGE SUMMARY
[facility]  Medical Surgical Unit  Hospital Medicine  Discharge Summary      Patient Name: Suhas Clark  MRN: [id number]  Patient Class: OP- Observation  Admission Date: 8/12/2022  Hospital Length of Stay: 0 days  Discharge Date and Time: 8/13/2022  2:25 PM  Attending Physician: No att. providers found   Discharging Provider: Kit Garcia MD  Primary Care Provider: SHINE Vaughn      HPI:   43yo WM with Morbid Obesity, HTN, DM2, Hypothyroid presented to ED early this am c/o acute abd pain recurrence. Pt was discharged yesterday after short admission for PSBO. He was completely asymptomatic all day yest tolerating diet. He felt well after getting home. He awoke around 2-3am with sharp pains in his abd that felt like a burning sensation, mainly in the right carolee-abdomen radiating around to his back. Denies any nausea/vomiting. He presented back to ED for eval. CT A/P shows scattered areas of fluid-filled and thickened bowel without obvious transition point. Labs are completley normal again. States he has had 2 large BM's since admission and does actually feel somewhat better, although he has had IV analgesia. Only current complaint is mild soreness/tenderness in abd now that pain med seems to be wearing off.      * No surgery found *      Hospital Course:   Pt treated for Enteritis and symptoms quickly resolved. Tolerating PO diet. Discharged in stable condition.    Goals of Care Treatment Preferences:  Code Status: Full Code      Consults:     No new Assessment & Plan notes have been filed under this hospital service since the last note was generated.  Service: Hospital Medicine    Final Active Diagnoses:    Diagnosis Date Noted POA    PRINCIPAL PROBLEM:  Enteritis [K52.9] 08/12/2022 Yes    Ileus [K56.7] 08/12/2022 Yes    Hypothyroidism [E03.9] 08/10/2022 Yes     Chronic    Obstructive sleep apnea syndrome [G47.33] 08/10/2022 Yes     Chronic    Diabetes mellitus [E11.9]  Yes     Chronic     Hypertension [I10]  Yes     Chronic      Problems Resolved During this Admission:       Discharged Condition: stable    Disposition: Home or Self Care    Follow Up:   Follow-up Information     SHINE Vaughn Follow up.    Specialty: Family Medicine  Contact information:  [facility] [place] [id number] [phone]                       Patient Instructions:      Diet diabetic     Activity as tolerated       Pending Diagnostic Studies:     None         Medications:  Reconciled Home Medications:      Medication List      CONTINUE taking these medications    ALBUTEROL INHL  Inhale 2 puffs into the lungs every 4 (four) hours as needed (SOB). 90 MCG INHL     amLODIPine 10 MG tablet  Commonly known as: NORVASC  Take 10 mg by mouth once daily. Amlodipine Besylate- 10 mg Every Day     anastrozole 1 mg Tab  Commonly known as: ARIMIDEX  Take 1 mg by mouth twice a week.     atenoloL 50 MG tablet  Commonly known as: TENORMIN  Take 50 mg by mouth once daily.     atorvastatin 10 MG tablet  Commonly known as: LIPITOR  Take 10 mg by mouth once daily. Every Evening     clonazePAM 1 MG tablet  Commonly known as: KlonoPIN  Take 2 mg by mouth 2 (two) times daily.     folic acid 400 MCG tablet  Commonly known as: FOLVITE  Take 400 mcg by mouth once daily.     glimepiride 4 MG tablet  Commonly known as: AMARYL  4 mg 2 (two) times daily.     ibuprofen 200 MG tablet  Commonly known as: ADVIL,MOTRIN  Take 600 mg by mouth 3 (three) times daily. 3 times a day for 7 days     levothyroxine 100 MCG tablet  Commonly known as: SYNTHROID  Take 100 mcg by mouth Daily. Every morning on empty stomach     pioglitazone-metformin  mg per tablet  Commonly known as: ACTOPLUS MET  Take 1 tablet by mouth once daily.     predniSONE 20 MG tablet  Commonly known as: DELTASONE  Take 20 mg by mouth once daily. Daily for 3 days     testosterone cypionate 100 mg/mL injection  Commonly known as: DEPOTESTOTERONE CYPIONATE  Inject 200  mg into the muscle once a week. ON FRIDAYS     VASCEPA 1 gram Cap  Generic drug: icosapent ethyL  Take 2 g by mouth 2 (two) times daily.     VITAMIN D2 50,000 unit Cap  Generic drug: ergocalciferol  Take 50,000 Units by mouth once a week.        ASK your doctor about these medications    ciprofloxacin HCl 500 MG tablet  Commonly known as: CIPRO  Take 1 tablet (500 mg total) by mouth 2 (two) times daily. for 5 days  Ask about: Should I take this medication?     metroNIDAZOLE 500 MG tablet  Commonly known as: FLAGYL  Take 1 tablet (500 mg total) by mouth 3 (three) times daily. for 5 days  Ask about: Should I take this medication?            Indwelling Lines/Drains at time of discharge:   Lines/Drains/Airways     None                 Time spent on the discharge of patient: 32 minutes         Kit Garcia MD  Department of Hospital Medicine  [facility] - Medical Surgical Unit

## 2023-03-06 ENCOUNTER — HOSPITAL ENCOUNTER (EMERGENCY)
Facility: HOSPITAL | Age: 43
Discharge: HOME | End: 2023-03-06
Attending: INTERNAL MEDICINE
Payer: MEDICAID

## 2023-03-06 VITALS
DIASTOLIC BLOOD PRESSURE: 99 MMHG | RESPIRATION RATE: 18 BRPM | BODY MASS INDEX: 58.3 KG/M2 | OXYGEN SATURATION: 96 % | HEART RATE: 89 BPM | WEIGHT: 315 LBS | SYSTOLIC BLOOD PRESSURE: 139 MMHG

## 2023-03-06 DIAGNOSIS — K29.70 GASTRITIS, PRESENCE OF BLEEDING UNSPECIFIED, UNSPECIFIED CHRONICITY, UNSPECIFIED GASTRITIS TYPE: Primary | ICD-10-CM

## 2023-03-06 DIAGNOSIS — R10.9 ABDOMINAL PAIN: ICD-10-CM

## 2023-03-06 PROBLEM — N20.0 KIDNEY STONE: Status: ACTIVE | Noted: 2019-09-05

## 2023-03-06 LAB
ALBUMIN SERPL-MCNC: 4.1 G/DL (ref 3.5–5)
ALBUMIN/GLOB SERPL: 1.2 RATIO (ref 1.1–2)
ALP SERPL-CCNC: 49 UNIT/L (ref 40–150)
ALT SERPL-CCNC: 20 UNIT/L (ref 0–55)
APPEARANCE UR: CLEAR
AST SERPL-CCNC: 19 UNIT/L (ref 5–34)
BACTERIA #/AREA URNS AUTO: ABNORMAL /HPF
BASOPHILS # BLD AUTO: 0.1 X10(3)/MCL (ref 0–0.2)
BASOPHILS NFR BLD AUTO: 1.1 %
BILIRUB UR QL STRIP.AUTO: NEGATIVE MG/DL
BILIRUBIN DIRECT+TOT PNL SERPL-MCNC: 0.4 MG/DL
BUN SERPL-MCNC: 17 MG/DL (ref 8.9–20.6)
CALCIUM SERPL-MCNC: 9.4 MG/DL (ref 8.4–10.2)
CHLORIDE SERPL-SCNC: 108 MMOL/L (ref 98–107)
CO2 SERPL-SCNC: 24 MMOL/L (ref 22–29)
COLOR UR AUTO: YELLOW
CREAT SERPL-MCNC: 1.12 MG/DL (ref 0.73–1.18)
EOSINOPHIL # BLD AUTO: 0.14 X10(3)/MCL (ref 0–0.9)
EOSINOPHIL NFR BLD AUTO: 1.5 %
ERYTHROCYTE [DISTWIDTH] IN BLOOD BY AUTOMATED COUNT: 13.8 % (ref 11.5–17)
GFR SERPLBLD CREATININE-BSD FMLA CKD-EPI: >60 MLS/MIN/1.73/M2
GLOBULIN SER-MCNC: 3.4 GM/DL (ref 2.4–3.5)
GLUCOSE SERPL-MCNC: 144 MG/DL (ref 74–100)
GLUCOSE UR QL STRIP.AUTO: NEGATIVE MG/DL
HCT VFR BLD AUTO: 51.2 % (ref 42–52)
HGB BLD-MCNC: 16.4 G/DL (ref 14–18)
IMM GRANULOCYTES # BLD AUTO: 0.03 X10(3)/MCL (ref 0–0.04)
IMM GRANULOCYTES NFR BLD AUTO: 0.3 %
KETONES UR QL STRIP.AUTO: NEGATIVE MG/DL
LEUKOCYTE ESTERASE UR QL STRIP.AUTO: ABNORMAL UNIT/L
LIPASE SERPL-CCNC: 35 U/L
LYMPHOCYTES # BLD AUTO: 2.24 X10(3)/MCL (ref 0.6–4.6)
LYMPHOCYTES NFR BLD AUTO: 24.1 %
MCH RBC QN AUTO: 26.8 PG
MCHC RBC AUTO-ENTMCNC: 32 G/DL (ref 33–36)
MCV RBC AUTO: 83.7 FL (ref 80–94)
MONOCYTES # BLD AUTO: 0.94 X10(3)/MCL (ref 0.1–1.3)
MONOCYTES NFR BLD AUTO: 10.1 %
NEUTROPHILS # BLD AUTO: 5.83 X10(3)/MCL (ref 2.1–9.2)
NEUTROPHILS NFR BLD AUTO: 62.9 %
NITRITE UR QL STRIP.AUTO: NEGATIVE
PH UR STRIP.AUTO: 5.5 [PH]
PLATELET # BLD AUTO: 298 X10(3)/MCL (ref 130–400)
PMV BLD AUTO: 9.3 FL (ref 7.4–10.4)
POTASSIUM SERPL-SCNC: 3.9 MMOL/L (ref 3.5–5.1)
PROT SERPL-MCNC: 7.5 GM/DL (ref 6.4–8.3)
PROT UR QL STRIP.AUTO: NEGATIVE MG/DL
RBC # BLD AUTO: 6.12 X10(6)/MCL (ref 4.7–6.1)
RBC #/AREA URNS AUTO: ABNORMAL /HPF
RBC UR QL AUTO: NEGATIVE UNIT/L
SODIUM SERPL-SCNC: 140 MMOL/L (ref 136–145)
SP GR UR STRIP.AUTO: 1.02
SQUAMOUS #/AREA URNS AUTO: ABNORMAL /HPF
UROBILINOGEN UR STRIP-ACNC: 0.2 MG/DL
WBC # SPEC AUTO: 9.3 X10(3)/MCL (ref 4.5–11.5)
WBC #/AREA URNS AUTO: ABNORMAL /HPF

## 2023-03-06 PROCEDURE — 80053 COMPREHEN METABOLIC PANEL: CPT | Performed by: INTERNAL MEDICINE

## 2023-03-06 PROCEDURE — 85025 COMPLETE CBC W/AUTO DIFF WBC: CPT | Performed by: INTERNAL MEDICINE

## 2023-03-06 PROCEDURE — 99284 EMERGENCY DEPT VISIT MOD MDM: CPT | Mod: 25

## 2023-03-06 PROCEDURE — 83690 ASSAY OF LIPASE: CPT | Performed by: INTERNAL MEDICINE

## 2023-03-06 PROCEDURE — 25000003 PHARM REV CODE 250: Performed by: INTERNAL MEDICINE

## 2023-03-06 PROCEDURE — 81001 URINALYSIS AUTO W/SCOPE: CPT | Performed by: INTERNAL MEDICINE

## 2023-03-06 RX ORDER — CLONAZEPAM 2 MG/1
2 TABLET ORAL 2 TIMES DAILY PRN
COMMUNITY
Start: 2023-02-15

## 2023-03-06 RX ORDER — BUDESONIDE AND FORMOTEROL FUMARATE DIHYDRATE 160; 4.5 UG/1; UG/1
AEROSOL RESPIRATORY (INHALATION) 2 TIMES DAILY
COMMUNITY

## 2023-03-06 RX ORDER — MAG HYDROX/ALUMINUM HYD/SIMETH 200-200-20
30 SUSPENSION, ORAL (FINAL DOSE FORM) ORAL ONCE
Status: COMPLETED | OUTPATIENT
Start: 2023-03-06 | End: 2023-03-06

## 2023-03-06 RX ORDER — FAMOTIDINE 20 MG/1
20 TABLET, FILM COATED ORAL 2 TIMES DAILY
Qty: 60 TABLET | Refills: 0 | Status: SHIPPED | OUTPATIENT
Start: 2023-03-06 | End: 2023-04-05

## 2023-03-06 RX ORDER — ALBUTEROL SULFATE 90 UG/1
AEROSOL, METERED RESPIRATORY (INHALATION) 3 TIMES DAILY
COMMUNITY
Start: 2023-01-17

## 2023-03-06 RX ADMIN — ALUMINUM HYDROXIDE, MAGNESIUM HYDROXIDE, AND SIMETHICONE 30 ML: 200; 200; 20 SUSPENSION ORAL at 04:03

## 2023-03-06 NOTE — ED PROVIDER NOTES
"03/06/2023         5:02 AM    Source of History:  History obtained from the patient.     Chief complaint:  From Nurse Triage:  Nausea (States began at 0100 with nausea and upper abdominal "burning" )    HISTORY OF PRESENT ILLNES:  Suhas Clark is a 42 y.o. male presenting with Nausea (States began at 0100 with nausea and upper abdominal "burning" )       Patient with morbid obesity, comes to the emergency room with complaint of nausea and upper abdominal burning pain which started around 1:00 a.m..  Patient says that he had problem with ileus in the past, in day before yesterday he ate some steak, yesterday he was not feeling good so he did not eat much, into started having burning in the upper abdomen.  Nausea, no vomiting, no diarrhea    REVIEW OF SYSTEMS:   Constitutional symptoms:  No Fever. No Chills    Skin symptoms:  No Rash.    Eye symptoms:  No Visual disturbance reported.   ENMT symptoms:  No Sore throat,    Respiratory symptoms:  No Shortness of Breath, no Cough, no Wheezing.    Cardiovascular symptoms:  No Chest Pain, No Palpitations, No Tachycardia.    Gastrointestinal symptoms:   Abdominal Pain, Nausea, No Vomiting, No Diarrhea, No Constipation.    Genitourinary symptoms:  No Dysuria,    Musculoskeletal symptoms:  No Back pain,    Neurologic symptoms:  No Headache, No Dizziness.    Psychiatric symptoms:  No Anxiety, No Depression, No Substance Abuse.              Additional review of systems information: Patient Denies Any Other Complaints.    All Other Systems Reviewed With Patient And Negative.    ALLEGIES:  Review of patient's allergies indicates:   Allergen Reactions    Lisinopril Anaphylaxis       MEDICINE LIST:  Current Outpatient Medications   Medication Instructions    albuterol (PROVENTIL/VENTOLIN HFA) 90 mcg/actuation inhaler Inhalation, 3 times daily    ALBUTEROL INHL 2 puffs, Inhalation, Every 4 hours PRN, 90 MCG INHL    amLODIPine (NORVASC) 10 mg, Oral, Daily, Amlodipine Besylate- 10 mg " Every Day    anastrozole (ARIMIDEX) 1 mg, Oral, Twice weekly    atenoloL (TENORMIN) 50 mg, Oral, Daily    atorvastatin (LIPITOR) 10 mg, Oral, Daily, Every Evening    budesonide-formoterol 160-4.5 mcg (SYMBICORT) 160-4.5 mcg/actuation HFAA 2 times daily    clonazePAM (KLONOPIN) 2 mg, Oral, 2 times daily PRN    ergocalciferol (VITAMIN D2) 50,000 Units, Oral, Weekly    famotidine (PEPCID) 20 mg, Oral, 2 times daily    folic acid (FOLVITE) 400 mcg, Oral, Daily    glimepiride (AMARYL) 4 mg, 2 times daily    icosapent ethyL (VASCEPA) 2 g, Oral, 2 times daily    levothyroxine (SYNTHROID) 100 mcg, Oral, Daily, Every morning on empty stomach    pioglitazone-metformin (ACTOPLUS MET)  mg per tablet 1 tablet, Oral, Daily        PMH:  As per HPI and below:    Reviewed and updated in chart.    PAST MEDICAL HISTORY:  Past Medical History:   Diagnosis Date    Diabetes mellitus     Hypertension     Hypothyroidism         PAST SURGICAL HISTORY:  Past Surgical History:   Procedure Laterality Date    CHOLECYSTECTOMY         SOCIAL HISTORY:  Social History     Tobacco Use    Smoking status: Never    Smokeless tobacco: Never   Substance Use Topics    Alcohol use: Not Currently    Drug use: Yes     Types: Marijuana     Comment: Medical marijuana       FAMILY HISTORY:  Family History   Problem Relation Age of Onset    Hypertension Father     Factor V Leiden deficiency Father         PROBLEM LIST:  Patient Active Problem List   Diagnosis    Small bowel obstruction, partial    Diabetes mellitus    Hypertension    Hypothyroidism    Obstructive sleep apnea syndrome    Enteritis    Ileus    Kidney stone        PHYSICAL EXAM:      ED Triage Vitals [03/06/23 0332]   BP (!) 145/105   Pulse 91   Resp 18   Temp    SpO2 99 %        Vital Signs: Reviewed As In Chart.  General:  Alert, No Cardiorespiratory Distress Noted.   Skin: Normal For Ethnic Origin  Eye:  Extraocular Movements Are Intact.   ENT: Mucus membranes are moist.   Cardiovascular:   Regular Rate And Rhythm, No Murmur, No Pedal Edema.    Respiratory:  Respirations Nonlabored, No Respiratory Distress, Good Bilateral Air Entry, No Rales, No Rhonchi.    Musculoskeletal:  No Gross Deformity Noted.   Gastrointestinal:  Soft, Non Distended, mild epigastric Tenderness, Normal Bowel Sounds.    Neurological:  Alert And Oriented To Person, Place, Time, And Situation, Normal Motor Observed, Normal Speech Observed.    Psychiatric:  Cooperative, Appropriate Mood & Affect.      ED WORKUP FOR MEDICAL DECISION MAKING:    ED ORDERS:  Orders Placed This Encounter   Procedures    X-Ray Abdomen Flat And Erect    CBC auto differential    Comprehensive metabolic panel    Urinalysis, Reflex to Urine Culture    Lipase    CBC with Differential    Urinalysis, Microscopic       ED MEDICINES:  Medications   aluminum-magnesium hydroxide-simethicone 200-200-20 mg/5 mL suspension 30 mL (30 mLs Oral Given 3/6/23 0405)                ED LABS ORDERED AND REVIEWED:  Admission on 03/06/2023   Component Date Value Ref Range Status    Sodium Level 03/06/2023 140  136 - 145 mmol/L Final    Potassium Level 03/06/2023 3.9  3.5 - 5.1 mmol/L Final    Chloride 03/06/2023 108 (H)  98 - 107 mmol/L Final    Carbon Dioxide 03/06/2023 24  22 - 29 mmol/L Final    Glucose Level 03/06/2023 144 (H)  74 - 100 mg/dL Final    Blood Urea Nitrogen 03/06/2023 17.0  8.9 - 20.6 mg/dL Final    Creatinine 03/06/2023 1.12  0.73 - 1.18 mg/dL Final    Calcium Level Total 03/06/2023 9.4  8.4 - 10.2 mg/dL Final    Protein Total 03/06/2023 7.5  6.4 - 8.3 gm/dL Final    Albumin Level 03/06/2023 4.1  3.5 - 5.0 g/dL Final    Globulin 03/06/2023 3.4  2.4 - 3.5 gm/dL Final    Albumin/Globulin Ratio 03/06/2023 1.2  1.1 - 2.0 ratio Final    Bilirubin Total 03/06/2023 0.4  <=1.5 mg/dL Final    Alkaline Phosphatase 03/06/2023 49  40 - 150 unit/L Final    Alanine Aminotransferase 03/06/2023 20  0 - 55 unit/L Final    Aspartate Aminotransferase 03/06/2023 19  5 - 34 unit/L  Final    eGFR 03/06/2023 >60  mls/min/1.73/m2 Final    Color, UA 03/06/2023 Yellow  Yellow, Light-Yellow, Dark Yellow, Colleen, Straw Final    Appearance, UA 03/06/2023 Clear  Clear Final    Specific Gravity, UA 03/06/2023 1.025   Final    pH, UA 03/06/2023 5.5  5.0 - 8.5 Final    Protein, UA 03/06/2023 Negative  Negative mg/dL Final    Glucose, UA 03/06/2023 Negative  Negative, Normal mg/dL Final    Ketones, UA 03/06/2023 Negative  Negative mg/dL Final    Blood, UA 03/06/2023 Negative  Negative unit/L Final    Bilirubin, UA 03/06/2023 Negative  Negative mg/dL Final    Urobilinogen, UA 03/06/2023 0.2  0.2, 1.0, Normal mg/dL Final    Nitrites, UA 03/06/2023 Negative  Negative Final    Leukocyte Esterase, UA 03/06/2023 Small (A)  Negative unit/L Final    Lipase Level 03/06/2023 35  <=60 U/L Final    WBC 03/06/2023 9.3  4.5 - 11.5 x10(3)/mcL Final    RBC 03/06/2023 6.12 (H)  4.70 - 6.10 x10(6)/mcL Final    Hgb 03/06/2023 16.4  14.0 - 18.0 g/dL Final    Hct 03/06/2023 51.2  42.0 - 52.0 % Final    MCV 03/06/2023 83.7  80.0 - 94.0 fL Final    MCH 03/06/2023 26.8  pg Final    MCHC 03/06/2023 32.0 (L)  33.0 - 36.0 g/dL Final    RDW 03/06/2023 13.8  11.5 - 17.0 % Final    Platelet 03/06/2023 298  130 - 400 x10(3)/mcL Final    MPV 03/06/2023 9.3  7.4 - 10.4 fL Final    Neut % 03/06/2023 62.9  % Final    Lymph % 03/06/2023 24.1  % Final    Mono % 03/06/2023 10.1  % Final    Eos % 03/06/2023 1.5  % Final    Basophil % 03/06/2023 1.1  % Final    Lymph # 03/06/2023 2.24  0.6 - 4.6 x10(3)/mcL Final    Neut # 03/06/2023 5.83  2.1 - 9.2 x10(3)/mcL Final    Mono # 03/06/2023 0.94  0.1 - 1.3 x10(3)/mcL Final    Eos # 03/06/2023 0.14  0 - 0.9 x10(3)/mcL Final    Baso # 03/06/2023 0.10  0 - 0.2 x10(3)/mcL Final    IG# 03/06/2023 0.03  0 - 0.04 x10(3)/mcL Final    IG% 03/06/2023 0.3  % Final    Bacteria, UA 03/06/2023 Rare  None Seen, Rare, Occasional /HPF Final    RBC, UA 03/06/2023 0-2  None Seen, 0-2, 3-5, 0-5 /HPF Final    WBC, UA  "03/06/2023 3-5  None Seen, 0-2, 3-5, 0-5 /HPF Final    Squamous Epithelial Cells, UA 03/06/2023 Few (A)  None Seen, Rare, Occasional, Occ /HPF Final       RADIOLOGY STUDIES ORDERED AND REVIEWED:  Imaging Results              X-Ray Abdomen Flat And Erect (Preliminary result)  Result time 03/06/23 04:28:39      Wet Read by Stacia Melgoza MD (03/06/23 04:28:39, [facility] - Emergency Dept, Emergency Medicine)    X-Ray, Independently Interpreted by Stacia Melgoza MD.  Flat and erect abdomen:  No free air, no fluid levels                                    MEDICAL DECISION MAKING:      Reviewed Nurses Note. Reviewed Vital Signs.     Reviewed Pertinent old records, History and updated as necessary.    Medical Decision Making    Suhas Clark 42 y.o. male with  has a past medical history of Diabetes mellitus, Hypertension, and Hypothyroidism. Comes to ER with c/o Nausea (States began at 0100 with nausea and upper abdominal "burning" )         Patient with morbid obesity, comes to the emergency room with complaint of nausea and upper abdominal burning pain which started around 1:00 a.m..  Patient says that he had problem with ileus in the past, in day before yesterday he ate some steak, yesterday he was not feeling good so he did not eat much, into started having burning in the upper abdomen.  Nausea, no vomiting, no diarrhea          Amount and/or Complexity of Data Reviewed  Labs: ordered. Decision-making details documented in ED Course.  Radiology: ordered and independent interpretation performed. Decision-making details documented in ED Course.        Vitals:    03/06/23 0500   BP: (!) 139/99   Pulse: 89   Resp: 18       ED Course as of 03/06/23 0508   Mon Mar 06, 2023   0504 Patient already had cholecystectomy, patient's workup otherwise does not show any acute abdomen at this time, there is no fluid level no free air in the abdomen cavity, I do not think he has ileus or bowel obstruction at this time, " I will let him go home on Pepcid b.i.d. and will advise him to see his family doctor for follow-up. [GQ]   0505 BP(!): 139/99 [GQ]   0505 Pulse: 89 [GQ]   0505 Resp: 18 [GQ]   0505 SpO2: 96 % [GQ]   0507 After Maalox patient is feeling better, he says his burning is almost completely gone and he does not have much nausea left.  I advised him that I will put him on Pepcid and let him go home and see family doctor for follow-up [GQ]      ED Course User Index  [GQ] Stacia Melgoza MD          PROCEDURES PERFORMED IN ED:  Procedures    DIAGNOSTIC IMPRESSION:        ICD-10-CM ICD-9-CM   1. Gastritis, presence of bleeding unspecified, unspecified chronicity, unspecified gastritis type  K29.70 535.50   2. Abdominal pain  R10.9 789.00         ED Disposition Condition    Discharge Stable               Medication List        START taking these medications      famotidine 20 MG tablet  Commonly known as: PEPCID  Take 1 tablet (20 mg total) by mouth 2 (two) times daily.            ASK your doctor about these medications      albuterol 90 mcg/actuation inhaler  Commonly known as: PROVENTIL/VENTOLIN HFA     ALBUTEROL INHL     amLODIPine 10 MG tablet  Commonly known as: NORVASC     anastrozole 1 mg Tab  Commonly known as: ARIMIDEX     atenoloL 50 MG tablet  Commonly known as: TENORMIN     atorvastatin 10 MG tablet  Commonly known as: LIPITOR     budesonide-formoterol 160-4.5 mcg 160-4.5 mcg/actuation Hfaa  Commonly known as: SYMBICORT     clonazePAM 2 MG Tab  Commonly known as: KlonoPIN  Ask about: Which instructions should I use?     folic acid 400 MCG tablet  Commonly known as: FOLVITE     glimepiride 4 MG tablet  Commonly known as: AMARYL     levothyroxine 100 MCG tablet  Commonly known as: SYNTHROID     pioglitazone-metformin  mg per tablet  Commonly known as: ACTOPLUS MET     VASCEPA 1 gram Cap  Generic drug: icosapent ethyL     VITAMIN D2 50,000 unit Cap  Generic drug: ergocalciferol               Where to Get Your  Medications        These medications were sent to [facility] [place] - [address]  [address] [place] [address]      Phone: [phone]   famotidine 20 MG tablet           Follow-up Information       SHINE Vaughn In 2 days.    Specialty: Family Medicine  Contact information:  [address] [place] [address] [phone]                              ED Prescriptions       Medication Sig Dispense Start Date End Date Auth. Provider    famotidine (PEPCID) 20 MG tablet Take 1 tablet (20 mg total) by mouth 2 (two) times daily. 60 tablet 3/6/2023 4/5/2023 Stacia Melgoza MD          Follow-up Information       Follow up With Specialties Details Why Contact Info    [facility] In 2 days  [address] [facility] [place] [id number] [phone]                 Stacia Melgoza MD  03/06/23 1166

## 2024-01-05 ENCOUNTER — HOSPITAL ENCOUNTER (EMERGENCY)
Facility: HOSPITAL | Age: 44
Discharge: HOME | End: 2024-01-05
Attending: EMERGENCY MEDICINE
Payer: MEDICAID

## 2024-01-05 VITALS
DIASTOLIC BLOOD PRESSURE: 83 MMHG | WEIGHT: 315 LBS | OXYGEN SATURATION: 97 % | BODY MASS INDEX: 46.65 KG/M2 | RESPIRATION RATE: 18 BRPM | HEART RATE: 90 BPM | TEMPERATURE: 99 F | HEIGHT: 69 IN | SYSTOLIC BLOOD PRESSURE: 142 MMHG

## 2024-01-05 DIAGNOSIS — N20.1 URETEROLITHIASIS: Primary | ICD-10-CM

## 2024-01-05 LAB
ALBUMIN SERPL-MCNC: 4.1 G/DL (ref 3.5–5)
ALBUMIN/GLOB SERPL: 1.2 RATIO (ref 1.1–2)
ALP SERPL-CCNC: 53 UNIT/L (ref 40–150)
ALT SERPL-CCNC: 26 UNIT/L (ref 0–55)
APPEARANCE UR: ABNORMAL
AST SERPL-CCNC: 23 UNIT/L (ref 5–34)
BACTERIA #/AREA URNS AUTO: ABNORMAL /HPF
BASOPHILS # BLD AUTO: 0.08 X10(3)/MCL
BASOPHILS NFR BLD AUTO: 1 %
BILIRUB SERPL-MCNC: 0.6 MG/DL
BILIRUB UR QL STRIP.AUTO: NEGATIVE
BUN SERPL-MCNC: 13 MG/DL (ref 8.9–20.6)
CALCIUM SERPL-MCNC: 9.4 MG/DL (ref 8.4–10.2)
CHLORIDE SERPL-SCNC: 107 MMOL/L (ref 98–107)
CO2 SERPL-SCNC: 22 MMOL/L (ref 22–29)
COLOR UR AUTO: YELLOW
CREAT SERPL-MCNC: 1.26 MG/DL (ref 0.73–1.18)
EOSINOPHIL # BLD AUTO: 0.1 X10(3)/MCL (ref 0–0.9)
EOSINOPHIL NFR BLD AUTO: 1.3 %
ERYTHROCYTE [DISTWIDTH] IN BLOOD BY AUTOMATED COUNT: 14.3 % (ref 11.5–17)
GFR SERPLBLD CREATININE-BSD FMLA CKD-EPI: >60 MLS/MIN/1.73/M2
GLOBULIN SER-MCNC: 3.5 GM/DL (ref 2.4–3.5)
GLUCOSE SERPL-MCNC: 165 MG/DL (ref 74–100)
GLUCOSE UR QL STRIP.AUTO: NEGATIVE
HCT VFR BLD AUTO: 50.8 % (ref 42–52)
HGB BLD-MCNC: 17.2 G/DL (ref 14–18)
IMM GRANULOCYTES # BLD AUTO: 0.03 X10(3)/MCL (ref 0–0.04)
IMM GRANULOCYTES NFR BLD AUTO: 0.4 %
KETONES UR QL STRIP.AUTO: NEGATIVE
LEUKOCYTE ESTERASE UR QL STRIP.AUTO: NEGATIVE
LYMPHOCYTES # BLD AUTO: 2.09 X10(3)/MCL (ref 0.6–4.6)
LYMPHOCYTES NFR BLD AUTO: 27.1 %
MCH RBC QN AUTO: 28.1 PG (ref 27–31)
MCHC RBC AUTO-ENTMCNC: 33.9 G/DL (ref 33–36)
MCV RBC AUTO: 82.9 FL (ref 80–94)
MONOCYTES # BLD AUTO: 0.84 X10(3)/MCL (ref 0.1–1.3)
MONOCYTES NFR BLD AUTO: 10.9 %
MUCOUS THREADS URNS QL MICRO: ABNORMAL /LPF
NEUTROPHILS # BLD AUTO: 4.57 X10(3)/MCL (ref 2.1–9.2)
NEUTROPHILS NFR BLD AUTO: 59.3 %
NITRITE UR QL STRIP.AUTO: NEGATIVE
PH UR STRIP.AUTO: 5.5 [PH]
PLATELET # BLD AUTO: 274 X10(3)/MCL (ref 130–400)
PMV BLD AUTO: 9 FL (ref 7.4–10.4)
POTASSIUM SERPL-SCNC: 4 MMOL/L (ref 3.5–5.1)
PROT SERPL-MCNC: 7.6 GM/DL (ref 6.4–8.3)
PROT UR QL STRIP.AUTO: NEGATIVE
RBC # BLD AUTO: 6.13 X10(6)/MCL (ref 4.7–6.1)
RBC #/AREA URNS AUTO: ABNORMAL /HPF
RBC UR QL AUTO: ABNORMAL
SODIUM SERPL-SCNC: 140 MMOL/L (ref 136–145)
SP GR UR STRIP.AUTO: >=1.03 (ref 1–1.03)
SQUAMOUS #/AREA URNS AUTO: ABNORMAL /HPF
UROBILINOGEN UR STRIP-ACNC: 0.2
WBC # SPEC AUTO: 7.71 X10(3)/MCL (ref 4.5–11.5)
WBC #/AREA URNS AUTO: ABNORMAL /HPF

## 2024-01-05 PROCEDURE — 96375 TX/PRO/DX INJ NEW DRUG ADDON: CPT

## 2024-01-05 PROCEDURE — 99285 EMERGENCY DEPT VISIT HI MDM: CPT | Mod: 25

## 2024-01-05 PROCEDURE — 96374 THER/PROPH/DIAG INJ IV PUSH: CPT

## 2024-01-05 PROCEDURE — 81003 URINALYSIS AUTO W/O SCOPE: CPT | Performed by: EMERGENCY MEDICINE

## 2024-01-05 PROCEDURE — 80053 COMPREHEN METABOLIC PANEL: CPT | Performed by: EMERGENCY MEDICINE

## 2024-01-05 PROCEDURE — 96361 HYDRATE IV INFUSION ADD-ON: CPT

## 2024-01-05 PROCEDURE — 63600175 PHARM REV CODE 636 W HCPCS: Performed by: EMERGENCY MEDICINE

## 2024-01-05 PROCEDURE — 85025 COMPLETE CBC W/AUTO DIFF WBC: CPT | Performed by: EMERGENCY MEDICINE

## 2024-01-05 RX ORDER — KETOROLAC TROMETHAMINE 10 MG/1
10 TABLET, FILM COATED ORAL EVERY 6 HOURS
Qty: 20 TABLET | Refills: 0 | Status: SHIPPED | OUTPATIENT
Start: 2024-01-05 | End: 2024-01-10

## 2024-01-05 RX ORDER — ONDANSETRON 8 MG/1
8 TABLET, ORALLY DISINTEGRATING ORAL EVERY 6 HOURS PRN
Qty: 20 TABLET | Refills: 0 | Status: SHIPPED | OUTPATIENT
Start: 2024-01-05 | End: 2024-01-10

## 2024-01-05 RX ORDER — KETOROLAC TROMETHAMINE 30 MG/ML
30 INJECTION, SOLUTION INTRAMUSCULAR; INTRAVENOUS
Status: COMPLETED | OUTPATIENT
Start: 2024-01-05 | End: 2024-01-05

## 2024-01-05 RX ORDER — ONDANSETRON 2 MG/ML
4 INJECTION INTRAMUSCULAR; INTRAVENOUS
Status: COMPLETED | OUTPATIENT
Start: 2024-01-05 | End: 2024-01-05

## 2024-01-05 RX ORDER — TAMSULOSIN HYDROCHLORIDE 0.4 MG/1
0.4 CAPSULE ORAL DAILY
Qty: 10 CAPSULE | Refills: 0 | Status: SHIPPED | OUTPATIENT
Start: 2024-01-05 | End: 2025-01-04

## 2024-01-05 RX ORDER — HYDROCODONE BITARTRATE AND ACETAMINOPHEN 10; 325 MG/1; MG/1
1 TABLET ORAL EVERY 6 HOURS PRN
Qty: 20 TABLET | Refills: 0 | Status: SHIPPED | OUTPATIENT
Start: 2024-01-05 | End: 2024-01-10

## 2024-01-05 RX ORDER — FENTANYL CITRATE 50 UG/ML
100 INJECTION, SOLUTION INTRAMUSCULAR; INTRAVENOUS
Status: COMPLETED | OUTPATIENT
Start: 2024-01-05 | End: 2024-01-05

## 2024-01-05 RX ADMIN — FENTANYL CITRATE 100 MCG: 50 INJECTION, SOLUTION INTRAMUSCULAR; INTRAVENOUS at 12:01

## 2024-01-05 RX ADMIN — SODIUM CHLORIDE, POTASSIUM CHLORIDE, SODIUM LACTATE AND CALCIUM CHLORIDE 1000 ML: 600; 310; 30; 20 INJECTION, SOLUTION INTRAVENOUS at 12:01

## 2024-01-05 RX ADMIN — ONDANSETRON 4 MG: 2 INJECTION INTRAMUSCULAR; INTRAVENOUS at 12:01

## 2024-01-05 RX ADMIN — KETOROLAC TROMETHAMINE 30 MG: 30 INJECTION, SOLUTION INTRAMUSCULAR; INTRAVENOUS at 12:01

## 2024-01-05 NOTE — ED PROVIDER NOTES
Encounter Date: 1/5/2024       History     Chief Complaint   Patient presents with    Flank Pain     Pt c/o left flank pain x 30 minutes, states he thinks he saw blood in his urine.     The history is provided by the patient.   Flank Pain  This is a new problem. The current episode started yesterday. Episode frequency: resolved yesterday, recurred this AM. The problem has not changed since onset.Associated symptoms include abdominal pain. Pertinent negatives include no chest pain and no shortness of breath.   Feels like prior kidney stones.    Review of patient's allergies indicates:   Allergen Reactions    Lisinopril Anaphylaxis     Past Medical History:   Diagnosis Date    Diabetes mellitus     Hypertension     Hypothyroidism      Past Surgical History:   Procedure Laterality Date    CHOLECYSTECTOMY       Family History   Problem Relation Age of Onset    Hypertension Father     Factor V Leiden deficiency Father      Social History     Tobacco Use    Smoking status: Never    Smokeless tobacco: Never   Substance Use Topics    Alcohol use: Not Currently    Drug use: Yes     Types: Marijuana     Comment: Medical marijuana     Review of Systems   Constitutional:  Negative for fever.   HENT:  Negative for sore throat.    Respiratory:  Negative for shortness of breath.    Cardiovascular:  Negative for chest pain.   Gastrointestinal:  Positive for abdominal pain. Negative for nausea.   Genitourinary:  Positive for flank pain. Negative for dysuria.   Musculoskeletal:  Negative for back pain.   Skin:  Negative for rash.   Neurological:  Negative for weakness.   Hematological:  Does not bruise/bleed easily.       Physical Exam     Initial Vitals [01/05/24 1142]   BP Pulse Resp Temp SpO2   (!) 152/98 103 18 98.6 °F (37 °C) 97 %      MAP       --         Physical Exam    Nursing note and vitals reviewed.  Constitutional: He appears well-developed and well-nourished. He appears distressed (appears uncomfortable).   HENT:    Head: Normocephalic and atraumatic.   Right Ear: External ear normal.   Left Ear: External ear normal.   Nose: Nose normal.   Eyes: Conjunctivae and EOM are normal. Pupils are equal, round, and reactive to light.   Neck: Neck supple.   Normal range of motion.  Cardiovascular:  Regular rhythm, normal heart sounds and intact distal pulses.   Tachycardia present.         Pulmonary/Chest: Breath sounds normal.   Abdominal: Abdomen is soft. Bowel sounds are normal.   Morbidly obese   Musculoskeletal:         General: Normal range of motion.      Cervical back: Normal range of motion and neck supple.     Neurological: He is alert and oriented to person, place, and time. He has normal strength. GCS score is 15. GCS eye subscore is 4. GCS verbal subscore is 5. GCS motor subscore is 6.   Skin: Skin is warm and dry. Capillary refill takes less than 2 seconds.   Psychiatric: He has a normal mood and affect. His behavior is normal. Judgment and thought content normal.         ED Course   Procedures  Labs Reviewed   URINALYSIS, REFLEX TO URINE CULTURE - Abnormal; Notable for the following components:       Result Value    Appearance, UA Slightly Cloudy (*)     Blood, UA 3+ (*)     All other components within normal limits   URINALYSIS, MICROSCOPIC - Abnormal; Notable for the following components:    Mucous, UA Trace (*)     RBC, UA 51-99 (*)     Squamous Epithelial Cells, UA Few (*)     All other components within normal limits   COMPREHENSIVE METABOLIC PANEL - Abnormal; Notable for the following components:    Glucose Level 165 (*)     Creatinine 1.26 (*)     All other components within normal limits   CBC WITH DIFFERENTIAL - Abnormal; Notable for the following components:    RBC 6.13 (*)     All other components within normal limits   CBC W/ AUTO DIFFERENTIAL    Narrative:     The following orders were created for panel order CBC auto differential.  Procedure                               Abnormality         Status                      ---------                               -----------         ------                     CBC with Differential[[id number]]        Abnormal            Final result                 Please view results for these tests on the individual orders.          Imaging Results              CT Abdomen Pelvis  Without Contrast (Final result)  Result time 01/05/24 12:11:29      Final result by Isabella Garcia MD (01/05/24 12:11:29)                   Impression:      Punctate proximal, left ureteral stone with trace hydronephrosis.      Electronically signed by: Isabella Garcia  Date:    01/05/2024  Time:    12:11               Narrative:    EXAMINATION:  CT ABDOMEN PELVIS WITHOUT CONTRAST    CLINICAL HISTORY:  Flank pain, kidney stone suspected;    TECHNIQUE:  Helically acquired axial images, sagittal and coronal reformations were obtained from the lung bases to the pubic symphysis without the IV administration of contrast.    Automated tube current modulation, weight-based exposure dosing, and/or iterative reconstruction technique utilized to reach lowest reasonably achievable exposure rate.    DLP: 1301 mGy*cm    COMPARISON:  CT abdomen pelvis 08/12/2022    FINDINGS:  HEART: Normal in size. No pericardial effusion.    LUNG BASES: Well aerated.    LIVER: Normal attenuation. No appreciable focal hepatic lesion.    BILIARY: Gallbladder is surgically absent.    PANCREAS: No inflammatory change.    SPLEEN: Normal in size    ADRENALS: No mass.    KIDNEYS/URETERS: There is a punctate 4 mm calculus at the proximal left ureter with trace left hydronephrosis.  No right renal or right ureteral stone.  No right hydronephrosis.  There is small, exophytic incidental fluid attenuation, 2.2 cm right renal cyst which requires no imaging follow-up.    GI TRACT/MESENTERY: Evaluation of the bowel is limited without contrast.  No evidence of bowel obstruction or inflammation. The appendix is normal.    PERITONEUM: No free fluid.No free  air.    LYMPH NODES: No enlarged lymph nodes by size criteria.    VASCULATURE: No significant atherosclerosis or aneurysm.    BLADDER: Nondistended bladder limits CT evaluation    REPRODUCTIVE ORGANS: Normal as visualized.    ABDOMINAL WALL: Unremarkable.    BONES: Sacralization of L5 on the right.                                       Medications   ondansetron injection 4 mg (4 mg Intravenous Given 1/5/24 1212)   ketorolac injection 30 mg (30 mg Intravenous Given 1/5/24 1212)   lactated ringers bolus 1,000 mL (0 mLs Intravenous Stopped 1/5/24 1313)   fentaNYL injection 100 mcg (100 mcg Intravenous Given 1/5/24 1240)     Medical Decision Making  Amount and/or Complexity of Data Reviewed  Labs: ordered. Decision-making details documented in ED Course.  Radiology: ordered. Decision-making details documented in ED Course.    Risk  Prescription drug management.    Differential includes:  ureterolithiasis, UTI, diverticulitis, colitis, renal mass, undifferentiated abdominal pain.  Will obtain CBC, CMP, UA, CT abdomen/pelvis without contrast and give IVF, analgesic, antiemetic.           ED Course as of 01/05/24 1324   Fri Jan 05, 2024   1231 Still having pain.  Discussed CT results. [CL]   1321 Pain improved after fentanyl.  Stable for D/C [CL]      ED Course User Index  [CL] Rodrigo Jose MD                           Clinical Impression:  Final diagnoses:  [N20.1] Ureterolithiasis (Primary)          ED Disposition Condition    Discharge Stable          ED Prescriptions       Medication Sig Dispense Start Date End Date Auth. Provider    ketorolac (TORADOL) 10 mg tablet Take 1 tablet (10 mg total) by mouth every 6 (six) hours. for 5 days 20 tablet 1/5/2024 1/10/2024 Rodrigo Jose MD    tamsulosin (FLOMAX) 0.4 mg Cap Take 1 capsule (0.4 mg total) by mouth once daily. 10 capsule 1/5/2024 1/4/2025 Rodrigo Jose MD    HYDROcodone-acetaminophen (NORCO)  mg per tablet Take 1 tablet by  mouth every 6 (six) hours as needed for Pain. Final diagnoses: [N20.1] Ureterolithiasis (Primary) 20 tablet 1/5/2024 1/10/2024 Rodrigo Jose MD    ondansetron (ZOFRAN-ODT) 8 MG TbDL Take 1 tablet (8 mg total) by mouth every 6 (six) hours as needed (nausea). 20 tablet 1/5/2024 1/10/2024 Rodrigo Jose MD          Follow-up Information       Follow up With Specialties Details Why Contact Info    Follow up with your primary MD in 3-5 days if not improved.  Return to ED for worsening symptoms.                 Rodrigo Jose MD  01/05/24 [id number]